# Patient Record
Sex: FEMALE | Race: BLACK OR AFRICAN AMERICAN | NOT HISPANIC OR LATINO | Employment: UNEMPLOYED | URBAN - METROPOLITAN AREA
[De-identification: names, ages, dates, MRNs, and addresses within clinical notes are randomized per-mention and may not be internally consistent; named-entity substitution may affect disease eponyms.]

---

## 2023-11-25 ENCOUNTER — HOSPITAL ENCOUNTER (INPATIENT)
Facility: HOSPITAL | Age: 38
LOS: 4 days | Discharge: HOME | DRG: 871 | End: 2023-11-30
Attending: EMERGENCY MEDICINE | Admitting: STUDENT IN AN ORGANIZED HEALTH CARE EDUCATION/TRAINING PROGRAM

## 2023-11-25 DIAGNOSIS — N39.0 URINARY TRACT INFECTION WITHOUT HEMATURIA, SITE UNSPECIFIED: ICD-10-CM

## 2023-11-25 DIAGNOSIS — U07.1 COVID-19 VIRUS INFECTION: ICD-10-CM

## 2023-11-25 DIAGNOSIS — E11.65 TYPE 2 DIABETES MELLITUS WITH HYPERGLYCEMIA, WITHOUT LONG-TERM CURRENT USE OF INSULIN (H): Primary | ICD-10-CM

## 2023-11-25 LAB
ALBUMIN UR-MCNC: 70 MG/DL
ANION GAP SERPL CALCULATED.3IONS-SCNC: 17 MMOL/L (ref 7–15)
APPEARANCE UR: ABNORMAL
BACTERIA #/AREA URNS HPF: ABNORMAL /HPF
BASOPHILS # BLD AUTO: 0.1 10E3/UL (ref 0–0.2)
BASOPHILS NFR BLD AUTO: 1 %
BILIRUB UR QL STRIP: NEGATIVE
BUN SERPL-MCNC: 16.7 MG/DL (ref 6–20)
CALCIUM SERPL-MCNC: 8.8 MG/DL (ref 8.6–10)
CHLORIDE SERPL-SCNC: 94 MMOL/L (ref 98–107)
COLOR UR AUTO: YELLOW
CREAT SERPL-MCNC: 0.76 MG/DL (ref 0.51–0.95)
DEPRECATED HCO3 PLAS-SCNC: 18 MMOL/L (ref 22–29)
EGFRCR SERPLBLD CKD-EPI 2021: >90 ML/MIN/1.73M2
EOSINOPHIL # BLD AUTO: 0 10E3/UL (ref 0–0.7)
EOSINOPHIL NFR BLD AUTO: 0 %
ERYTHROCYTE [DISTWIDTH] IN BLOOD BY AUTOMATED COUNT: 14.9 % (ref 10–15)
FLUAV RNA SPEC QL NAA+PROBE: NEGATIVE
FLUBV RNA RESP QL NAA+PROBE: NEGATIVE
GLUCOSE BLDC GLUCOMTR-MCNC: 335 MG/DL (ref 70–99)
GLUCOSE SERPL-MCNC: 396 MG/DL (ref 70–99)
GLUCOSE UR STRIP-MCNC: >1000 MG/DL
HBA1C MFR BLD: 13.5 %
HCT VFR BLD AUTO: 32.9 % (ref 35–47)
HGB BLD-MCNC: 10.1 G/DL (ref 11.7–15.7)
HGB UR QL STRIP: ABNORMAL
IMM GRANULOCYTES # BLD: 0 10E3/UL
IMM GRANULOCYTES NFR BLD: 0 %
KETONES UR STRIP-MCNC: 150 MG/DL
LACTATE SERPL-SCNC: 1.4 MMOL/L (ref 0.7–2)
LEUKOCYTE ESTERASE UR QL STRIP: ABNORMAL
LYMPHOCYTES # BLD AUTO: 0.9 10E3/UL (ref 0.8–5.3)
LYMPHOCYTES NFR BLD AUTO: 8 %
MCH RBC QN AUTO: 23 PG (ref 26.5–33)
MCHC RBC AUTO-ENTMCNC: 30.7 G/DL (ref 31.5–36.5)
MCV RBC AUTO: 75 FL (ref 78–100)
MONOCYTES # BLD AUTO: 1.2 10E3/UL (ref 0–1.3)
MONOCYTES NFR BLD AUTO: 11 %
MUCOUS THREADS #/AREA URNS LPF: PRESENT /LPF
NEUTROPHILS # BLD AUTO: 8.8 10E3/UL (ref 1.6–8.3)
NEUTROPHILS NFR BLD AUTO: 80 %
NITRATE UR QL: NEGATIVE
NRBC # BLD AUTO: 0 10E3/UL
NRBC BLD AUTO-RTO: 0 /100
PH UR STRIP: 6 [PH] (ref 5–7)
PLATELET # BLD AUTO: 232 10E3/UL (ref 150–450)
POTASSIUM SERPL-SCNC: 3.8 MMOL/L (ref 3.4–5.3)
RBC # BLD AUTO: 4.4 10E6/UL (ref 3.8–5.2)
RBC URINE: 18 /HPF
RSV RNA SPEC NAA+PROBE: NEGATIVE
SARS-COV-2 RNA RESP QL NAA+PROBE: POSITIVE
SODIUM SERPL-SCNC: 129 MMOL/L (ref 135–145)
SP GR UR STRIP: 1.02 (ref 1–1.03)
SQUAMOUS EPITHELIAL: 9 /HPF
UROBILINOGEN UR STRIP-MCNC: <2 MG/DL
WBC # BLD AUTO: 11 10E3/UL (ref 4–11)
WBC CLUMPS #/AREA URNS HPF: PRESENT /HPF
WBC URINE: >182 /HPF

## 2023-11-25 PROCEDURE — 83735 ASSAY OF MAGNESIUM: CPT | Performed by: INTERNAL MEDICINE

## 2023-11-25 PROCEDURE — 87637 SARSCOV2&INF A&B&RSV AMP PRB: CPT | Performed by: EMERGENCY MEDICINE

## 2023-11-25 PROCEDURE — 85025 COMPLETE CBC W/AUTO DIFF WBC: CPT | Performed by: EMERGENCY MEDICINE

## 2023-11-25 PROCEDURE — 250N000013 HC RX MED GY IP 250 OP 250 PS 637: Performed by: STUDENT IN AN ORGANIZED HEALTH CARE EDUCATION/TRAINING PROGRAM

## 2023-11-25 PROCEDURE — 96375 TX/PRO/DX INJ NEW DRUG ADDON: CPT

## 2023-11-25 PROCEDURE — 83605 ASSAY OF LACTIC ACID: CPT | Performed by: EMERGENCY MEDICINE

## 2023-11-25 PROCEDURE — 96365 THER/PROPH/DIAG IV INF INIT: CPT | Mod: 59

## 2023-11-25 PROCEDURE — 87077 CULTURE AEROBIC IDENTIFY: CPT | Performed by: EMERGENCY MEDICINE

## 2023-11-25 PROCEDURE — 250N000012 HC RX MED GY IP 250 OP 636 PS 637: Performed by: STUDENT IN AN ORGANIZED HEALTH CARE EDUCATION/TRAINING PROGRAM

## 2023-11-25 PROCEDURE — 87086 URINE CULTURE/COLONY COUNT: CPT | Performed by: EMERGENCY MEDICINE

## 2023-11-25 PROCEDURE — 87149 DNA/RNA DIRECT PROBE: CPT | Performed by: EMERGENCY MEDICINE

## 2023-11-25 PROCEDURE — 81001 URINALYSIS AUTO W/SCOPE: CPT | Performed by: EMERGENCY MEDICINE

## 2023-11-25 PROCEDURE — 99285 EMERGENCY DEPT VISIT HI MDM: CPT | Mod: 25

## 2023-11-25 PROCEDURE — 80053 COMPREHEN METABOLIC PANEL: CPT | Performed by: EMERGENCY MEDICINE

## 2023-11-25 PROCEDURE — 94640 AIRWAY INHALATION TREATMENT: CPT

## 2023-11-25 PROCEDURE — 82962 GLUCOSE BLOOD TEST: CPT

## 2023-11-25 PROCEDURE — 82248 BILIRUBIN DIRECT: CPT | Performed by: INTERNAL MEDICINE

## 2023-11-25 PROCEDURE — G0378 HOSPITAL OBSERVATION PER HR: HCPCS

## 2023-11-25 PROCEDURE — 250N000011 HC RX IP 250 OP 636: Mod: JZ | Performed by: EMERGENCY MEDICINE

## 2023-11-25 PROCEDURE — 250N000011 HC RX IP 250 OP 636: Mod: JZ | Performed by: STUDENT IN AN ORGANIZED HEALTH CARE EDUCATION/TRAINING PROGRAM

## 2023-11-25 PROCEDURE — 250N000009 HC RX 250: Performed by: EMERGENCY MEDICINE

## 2023-11-25 PROCEDURE — 99222 1ST HOSP IP/OBS MODERATE 55: CPT | Performed by: STUDENT IN AN ORGANIZED HEALTH CARE EDUCATION/TRAINING PROGRAM

## 2023-11-25 PROCEDURE — 82728 ASSAY OF FERRITIN: CPT | Performed by: STUDENT IN AN ORGANIZED HEALTH CARE EDUCATION/TRAINING PROGRAM

## 2023-11-25 PROCEDURE — 36415 COLL VENOUS BLD VENIPUNCTURE: CPT | Performed by: EMERGENCY MEDICINE

## 2023-11-25 PROCEDURE — 96361 HYDRATE IV INFUSION ADD-ON: CPT

## 2023-11-25 PROCEDURE — 83036 HEMOGLOBIN GLYCOSYLATED A1C: CPT | Performed by: STUDENT IN AN ORGANIZED HEALTH CARE EDUCATION/TRAINING PROGRAM

## 2023-11-25 PROCEDURE — 250N000013 HC RX MED GY IP 250 OP 250 PS 637: Performed by: EMERGENCY MEDICINE

## 2023-11-25 PROCEDURE — 258N000003 HC RX IP 258 OP 636: Performed by: EMERGENCY MEDICINE

## 2023-11-25 PROCEDURE — 87637 SARSCOV2&INF A&B&RSV AMP PRB: CPT | Performed by: STUDENT IN AN ORGANIZED HEALTH CARE EDUCATION/TRAINING PROGRAM

## 2023-11-25 RX ORDER — PROCHLORPERAZINE 25 MG
25 SUPPOSITORY, RECTAL RECTAL EVERY 12 HOURS PRN
Status: DISCONTINUED | OUTPATIENT
Start: 2023-11-25 | End: 2023-11-30 | Stop reason: HOSPADM

## 2023-11-25 RX ORDER — POLYETHYLENE GLYCOL 3350 17 G/17G
17 POWDER, FOR SOLUTION ORAL 2 TIMES DAILY PRN
Status: DISCONTINUED | OUTPATIENT
Start: 2023-11-25 | End: 2023-11-30 | Stop reason: HOSPADM

## 2023-11-25 RX ORDER — LEVOFLOXACIN 750 MG/1
750 TABLET, FILM COATED ORAL DAILY
Status: DISCONTINUED | OUTPATIENT
Start: 2023-11-26 | End: 2023-11-26

## 2023-11-25 RX ORDER — ONDANSETRON 4 MG/1
4 TABLET, ORALLY DISINTEGRATING ORAL EVERY 6 HOURS PRN
Status: DISCONTINUED | OUTPATIENT
Start: 2023-11-25 | End: 2023-11-30 | Stop reason: HOSPADM

## 2023-11-25 RX ORDER — ACETAMINOPHEN 325 MG/1
650 TABLET ORAL ONCE
Status: DISCONTINUED | OUTPATIENT
Start: 2023-11-25 | End: 2023-11-25

## 2023-11-25 RX ORDER — IBUPROFEN 600 MG/1
600 TABLET, FILM COATED ORAL ONCE
Status: COMPLETED | OUTPATIENT
Start: 2023-11-25 | End: 2023-11-25

## 2023-11-25 RX ORDER — NICOTINE POLACRILEX 4 MG
15-30 LOZENGE BUCCAL
Status: DISCONTINUED | OUTPATIENT
Start: 2023-11-25 | End: 2023-11-27

## 2023-11-25 RX ORDER — AMOXICILLIN 250 MG
2 CAPSULE ORAL 2 TIMES DAILY PRN
Status: DISCONTINUED | OUTPATIENT
Start: 2023-11-25 | End: 2023-11-30 | Stop reason: HOSPADM

## 2023-11-25 RX ORDER — PROCHLORPERAZINE MALEATE 10 MG
10 TABLET ORAL EVERY 6 HOURS PRN
Status: DISCONTINUED | OUTPATIENT
Start: 2023-11-25 | End: 2023-11-30 | Stop reason: HOSPADM

## 2023-11-25 RX ORDER — DEXTROSE MONOHYDRATE 25 G/50ML
25-50 INJECTION, SOLUTION INTRAVENOUS
Status: DISCONTINUED | OUTPATIENT
Start: 2023-11-25 | End: 2023-11-27

## 2023-11-25 RX ORDER — IPRATROPIUM BROMIDE AND ALBUTEROL SULFATE 2.5; .5 MG/3ML; MG/3ML
3 SOLUTION RESPIRATORY (INHALATION) ONCE
Status: COMPLETED | OUTPATIENT
Start: 2023-11-25 | End: 2023-11-25

## 2023-11-25 RX ORDER — ACETAMINOPHEN 325 MG/1
650 TABLET ORAL ONCE
Status: COMPLETED | OUTPATIENT
Start: 2023-11-25 | End: 2023-11-25

## 2023-11-25 RX ORDER — CEFTRIAXONE 1 G/1
1 INJECTION, POWDER, FOR SOLUTION INTRAMUSCULAR; INTRAVENOUS ONCE
Status: COMPLETED | OUTPATIENT
Start: 2023-11-25 | End: 2023-11-25

## 2023-11-25 RX ORDER — ACETAMINOPHEN 325 MG/1
975 TABLET ORAL 3 TIMES DAILY PRN
Status: DISCONTINUED | OUTPATIENT
Start: 2023-11-25 | End: 2023-11-30 | Stop reason: HOSPADM

## 2023-11-25 RX ORDER — GABAPENTIN 300 MG/1
300 CAPSULE ORAL 3 TIMES DAILY
Status: DISCONTINUED | OUTPATIENT
Start: 2023-11-25 | End: 2023-11-30 | Stop reason: HOSPADM

## 2023-11-25 RX ORDER — GABAPENTIN 300 MG/1
300 CAPSULE ORAL 3 TIMES DAILY
COMMUNITY

## 2023-11-25 RX ORDER — ONDANSETRON 2 MG/ML
4 INJECTION INTRAMUSCULAR; INTRAVENOUS EVERY 6 HOURS PRN
Status: DISCONTINUED | OUTPATIENT
Start: 2023-11-25 | End: 2023-11-30 | Stop reason: HOSPADM

## 2023-11-25 RX ORDER — AMOXICILLIN 250 MG
1 CAPSULE ORAL 2 TIMES DAILY PRN
Status: DISCONTINUED | OUTPATIENT
Start: 2023-11-25 | End: 2023-11-30 | Stop reason: HOSPADM

## 2023-11-25 RX ADMIN — INSULIN GLARGINE 15 UNITS: 100 INJECTION, SOLUTION SUBCUTANEOUS at 23:08

## 2023-11-25 RX ADMIN — IPRATROPIUM BROMIDE AND ALBUTEROL SULFATE 3 ML: .5; 3 SOLUTION RESPIRATORY (INHALATION) at 16:07

## 2023-11-25 RX ADMIN — ACETAMINOPHEN 975 MG: 325 TABLET ORAL at 21:51

## 2023-11-25 RX ADMIN — SODIUM CHLORIDE 1000 ML: 9 INJECTION, SOLUTION INTRAVENOUS at 18:24

## 2023-11-25 RX ADMIN — ONDANSETRON 4 MG: 2 INJECTION INTRAMUSCULAR; INTRAVENOUS at 22:28

## 2023-11-25 RX ADMIN — ACETAMINOPHEN 650 MG: 325 TABLET ORAL at 14:37

## 2023-11-25 RX ADMIN — IBUPROFEN 600 MG: 600 TABLET, FILM COATED ORAL at 16:22

## 2023-11-25 RX ADMIN — CEFTRIAXONE SODIUM 1 G: 1 INJECTION, POWDER, FOR SOLUTION INTRAMUSCULAR; INTRAVENOUS at 17:48

## 2023-11-25 RX ADMIN — GABAPENTIN 300 MG: 300 CAPSULE ORAL at 21:51

## 2023-11-25 RX ADMIN — SODIUM CHLORIDE 1000 ML: 9 INJECTION, SOLUTION INTRAVENOUS at 16:58

## 2023-11-25 ASSESSMENT — ACTIVITIES OF DAILY LIVING (ADL)
ADLS_ACUITY_SCORE: 35

## 2023-11-25 NOTE — ED TRIAGE NOTES
Patient presents here with fever, chills, and vomiting that has occurred over the past three days.

## 2023-11-25 NOTE — ED PROVIDER NOTES
EMERGENCY DEPARTMENT ENCOUNTER      NAME: Sandee Arroyo  AGE: 38 year old female  YOB: 1985  MRN: [id number]  EVALUATION DATE & TIME: No admission date for patient encounter.    PCP: No Ref-Primary, Physician    ED PROVIDER: Sylvain Tatum D.O.      Chief Complaint   Patient presents with    Fever       FINAL IMPRESSION:  1. COVID-19 virus infection    2. Urinary tract infection without hematuria, site unspecified        ED COURSE & MEDICAL DECISION MAKING:    3:42 PM I met with the patient to gather history and to perform my initial exam. I discussed the plan for care while in the Emergency Department.  3:51 PM I spoke with Pharmacy regarding paxlovid and gabapentin  4:42 PM I rechecked and updated the patient   5:48 PM I rechecked and updated the patient   6:31 PM I spoke with Dr. Spencer, Hospitalist        Pertinent Labs & Imaging studies reviewed. (See chart for details)  38 year old female presents to the Emergency Department for evaluation of fever, body aches, fatigue, headache.  Patient also reported some urinary symptoms.  Known asthmatic as well.  Initial differential did include COVID-19, RSV, influenza.  Additionally was concern for the potential for UTI.  COVID-19 testing did come back positive with negative influenza and RSV testing.  UA also did come back positive consistent with a UTI.  Patient was febrile in the emergency department, that we did give her ibuprofen and Tylenol which brought the fever down.  Unfortunately this did not improve her tachycardia.  Even after IV fluids, she remained tachycardic did not show any definitive signs of sepsis.  However, because of this I did not feel comfortable with discharge to home, will admit for further management for UTI with early signs of potential sepsis.    Medical Decision Making    History:  Supplemental history from: Documented in chart, if applicable  External Record(s) reviewed: Documented in chart, if applicable.    Work  Up:  Chart documentation includes differential considered and any EKGs or imaging independently interpreted by provider, where specified.  In additional to work up documented, I considered the following work up: Documented in chart, if applicable.    External consultation:  Discussion of management with another provider: Documented in chart, if applicable    Complicating factors:  Care impacted by chronic illness: Cerebrovascular Disease  Care affected by social determinants of health: N/A    Disposition considerations: Admit.        At the conclusion of the encounter I discussed the results of all of the tests and the disposition. The questions were answered. The patient or family acknowledged understanding and was agreeable with the care plan.        HPI    Patient information was obtained from: Patient     Use of : N/A      Sandee Arroyo is a 38 year old female who presents with fever     Per Chart Review: Patient has a history of asthma and a stroke in 2007 after giving birth.     Per patient, she has had 3 days of cough, congestion, myalgias, chills, fever, and vomiting. She also has some urinary frequency, which is similar to her previous UTI. She denies sore throat.       REVIEW OF SYSTEMS  Constitutional:  Denies weight loss or weakness. Positive for fever, chills  Eyes:  No pain, discharge, redness  HENT:  Denies sore throat, ear pain. Positive for congestion.   Respiratory: No SOB, wheeze. Positive for cough  Cardiovascular:  No CP, palpitations  GI:  Denies abdominal pain, diarrhea. Positive for nausea, vomiting.   : Denies dysuria, hematuria. Positive for urinary frequency.   Musculoskeletal:  Denies any swelling or loss of function. Positive for myalgias.   Skin:  Denies rash, pallor  Neurologic:  Denies headache, focal weakness or sensory changes  Lymph: Denies swollen nodes    All other systems negative unless noted in HPI.    PAST MEDICAL HISTORY:  No past medical history on  file.    PAST SURGICAL HISTORY:  No past surgical history on file.      CURRENT MEDICATIONS:    Current Facility-Administered Medications   Medication    acetaminophen (TYLENOL) tablet 975 mg    glucose gel 15-30 g    Or    dextrose 50 % injection 25-50 mL    Or    glucagon injection 1 mg    gabapentin (NEURONTIN) capsule 300 mg    [START ON 11/26/2023] insulin aspart (NovoLOG) injection (RAPID ACTING)    insulin aspart (NovoLOG) injection (RAPID ACTING)    insulin glargine (LANTUS PEN) injection 15 Units    [START ON 11/26/2023] levofloxacin (LEVAQUIN) tablet 750 mg    melatonin tablet 5 mg    ondansetron (ZOFRAN ODT) ODT tab 4 mg    Or    ondansetron (ZOFRAN) injection 4 mg    polyethylene glycol (MIRALAX) Packet 17 g    prochlorperazine (COMPAZINE) injection 10 mg    Or    prochlorperazine (COMPAZINE) tablet 10 mg    Or    prochlorperazine (COMPAZINE) suppository 25 mg    senna-docusate (SENOKOT-S/PERICOLACE) 8.6-50 MG per tablet 1 tablet    Or    senna-docusate (SENOKOT-S/PERICOLACE) 8.6-50 MG per tablet 2 tablet     Current Outpatient Medications   Medication    gabapentin (NEURONTIN) 300 MG capsule         ALLERGIES:  No Known Allergies    FAMILY HISTORY:  No family history on file.    SOCIAL HISTORY:  Social History     Socioeconomic History    Marital status: Single       VITALS:  Patient Vitals for the past 24 hrs:   BP Temp Temp src Pulse Resp SpO2 Height Weight   11/25/23 1900 108/56 -- -- 105 -- 99 % -- --   11/25/23 1846 119/57 -- -- 111 -- 99 % -- --   11/25/23 1836 -- -- -- 108 -- 98 % -- --   11/25/23 1821 99/52 -- -- 112 -- 98 % -- --   11/25/23 1806 102/59 -- -- 112 -- 98 % -- --   11/25/23 1751 -- -- -- 116 -- 100 % -- --   11/25/23 1736 96/54 -- -- 117 -- 99 % -- --   11/25/23 1721 104/58 -- -- 116 -- 99 % -- --   11/25/23 1720 -- -- -- 116 -- 100 % -- --   11/25/23 1710 -- -- -- 119 -- 99 % -- --   11/25/23 1700 123/67 -- -- -- -- -- -- --   11/25/23 1640 114/60 -- -- (!) 124 -- 98 % -- --  "  11/25/23 1637 -- -- -- (!) 127 -- 98 % -- --   11/25/23 1627 108/55 -- -- (!) 129 -- 99 % -- --   11/25/23 1617 -- -- -- (!) 125 -- 98 % -- --   11/25/23 1607 111/59 -- -- (!) 121 -- 99 % -- --   11/25/23 1426 116/72 100.3  F (37.9  C) Oral (!) 139 18 100 % 1.6 m (5' 3\") 81.6 kg (180 lb)       PHYSICAL EXAM    VITAL SIGNS: /56   Pulse 105   Temp 100.3  F (37.9  C) (Oral)   Resp 18   Ht 1.6 m (5' 3\")   Wt 81.6 kg (180 lb)   SpO2 99%   BMI 31.89 kg/m      General Appearance: Well-appearing, well-nourished, no acute distress   Head:  Normocephalic, without obvious abnormality, atraumatic  Eyes:  PERRL, conjunctiva/corneas clear, EOM's intact,  ENT:  Lips, mucosa, and tongue normal, membranes are moist without pallor  Neck:  Normal ROM, symmetrical, trachea midline    Cardio:  Regular rate and rhythm, no murmur, rub or gallop, 2+ pulses symmetric in all extremities  Pulm:  Clear to auscultation bilaterally, respirations unlabored,  Abdomen:  Soft, non-tender, no rebound or guarding.  Musculoskeletal: Full ROM, no edema, no cyanosis, good ROM of major joints  Integument:  Warm, Dry, No erythema, No rash.    Neurologic:  Alert & oriented.  No focal deficits appreciated.  Ambulatory.  Psychiatric:  Affect normal, Judgment normal, Mood normal.      LABS  Results for orders placed or performed during the hospital encounter of 11/25/23 (from the past 24 hour(s))   Symptomatic Influenza A/B, RSV, & SARS-CoV2 PCR (COVID-19) Nasopharyngeal    Specimen: Nasopharyngeal; Swab   Result Value Ref Range    Influenza A PCR Negative Negative    Influenza B PCR Negative Negative    RSV PCR Negative Negative    SARS CoV2 PCR Positive (A) Negative    Narrative    Testing was performed using the Xpert Xpress CoV2/Flu/RSV Assay on the [employer] Instrument. This test should be ordered for the detection of SARS-CoV-2, influenza, and RSV viruses in individuals who meet clinical and/or epidemiological criteria. Test " performance is unknown in asymptomatic patients. This test is for in vitro diagnostic use under the FDA EUA for laboratories certified under CLIA to perform high or moderate complexity testing. This test has not been FDA cleared or approved. A negative result does not rule out the presence of PCR inhibitors in the specimen or target RNA in concentration below the limit of detection for the assay. If only one viral target is positive but coinfection with multiple targets is suspected, the sample should be re-tested with another FDA cleared, approved, or authorized test, if coinfection would change clinical management. This test was validated by the [facility]. These laboratories are certified under the Clinical Laboratory Improvement Amendments of 1988 (CLIA-88) as qualified to perform high complexity laboratory testing.   UA with Microscopic reflex to Culture    Specimen: Urine, Midstream   Result Value Ref Range    Color Urine Yellow Colorless, Straw, Light Yellow, Yellow    Appearance Urine Cloudy (A) Clear    Glucose Urine >1000 (A) Negative mg/dL    Bilirubin Urine Negative Negative    Ketones Urine 150 (A) Negative mg/dL    Specific Gravity Urine 1.023 1.001 - 1.030    Blood Urine 1.0 mg/dL (A) Negative    pH Urine 6.0 5.0 - 7.0    Protein Albumin Urine 70 (A) Negative mg/dL    Urobilinogen Urine <2.0 <2.0 mg/dL    Nitrite Urine Negative Negative    Leukocyte Esterase Urine 500 Aaron/uL (A) Negative    Bacteria Urine Many (A) None Seen /HPF    WBC Clumps Urine Present (A) None Seen /HPF    Mucus Urine Present (A) None Seen /LPF    RBC Urine 18 (H) <=2 /HPF    WBC Urine >182 (H) <=5 /HPF    Squamous Epithelials Urine 9 (H) <=1 /HPF    Narrative    Urine Culture ordered based on laboratory criteria   CBC with platelets + differential    Narrative    The following orders were created for panel order CBC with platelets + differential.  Procedure                               Abnormality          Status                     ---------                               -----------         ------                     CBC with platelets and d...[[id number]]  Abnormal            Final result                 Please view results for these tests on the individual orders.   Basic metabolic panel   Result Value Ref Range    Sodium 129 (L) 135 - 145 mmol/L    Potassium 3.8 3.4 - 5.3 mmol/L    Chloride 94 (L) 98 - 107 mmol/L    Carbon Dioxide (CO2) 18 (L) 22 - 29 mmol/L    Anion Gap 17 (H) 7 - 15 mmol/L    Urea Nitrogen 16.7 6.0 - 20.0 mg/dL    Creatinine 0.76 0.51 - 0.95 mg/dL    GFR Estimate >90 >60 mL/min/1.73m2    Calcium 8.8 8.6 - 10.0 mg/dL    Glucose 396 (H) 70 - 99 mg/dL   Lactic acid whole blood   Result Value Ref Range    Lactic Acid 1.4 0.7 - 2.0 mmol/L   CBC with platelets and differential   Result Value Ref Range    WBC Count 11.0 4.0 - 11.0 10e3/uL    RBC Count 4.40 3.80 - 5.20 10e6/uL    Hemoglobin 10.1 (L) 11.7 - 15.7 g/dL    Hematocrit 32.9 (L) 35.0 - 47.0 %    MCV 75 (L) 78 - 100 fL    MCH 23.0 (L) 26.5 - 33.0 pg    MCHC 30.7 (L) 31.5 - 36.5 g/dL    RDW 14.9 10.0 - 15.0 %    Platelet Count 232 150 - 450 10e3/uL    % Neutrophils 80 %    % Lymphocytes 8 %    % Monocytes 11 %    % Eosinophils 0 %    % Basophils 1 %    % Immature Granulocytes 0 %    NRBCs per 100 WBC 0 <1 /100    Absolute Neutrophils 8.8 (H) 1.6 - 8.3 10e3/uL    Absolute Lymphocytes 0.9 0.8 - 5.3 10e3/uL    Absolute Monocytes 1.2 0.0 - 1.3 10e3/uL    Absolute Eosinophils 0.0 0.0 - 0.7 10e3/uL    Absolute Basophils 0.1 0.0 - 0.2 10e3/uL    Absolute Immature Granulocytes 0.0 <=0.4 10e3/uL    Absolute NRBCs 0.0 10e3/uL   Hemoglobin A1c   Result Value Ref Range    Hemoglobin A1C 13.5 (H) <5.7 %         RADIOLOGY  No orders to display         MEDICATIONS GIVEN IN THE EMERGENCY:  Medications   gabapentin (NEURONTIN) capsule 300 mg (has no administration in time range)   senna-docusate (SENOKOT-S/PERICOLACE) 8.6-50 MG per tablet 1 tablet (has no  administration in time range)     Or   senna-docusate (SENOKOT-S/PERICOLACE) 8.6-50 MG per tablet 2 tablet (has no administration in time range)   ondansetron (ZOFRAN ODT) ODT tab 4 mg (has no administration in time range)     Or   ondansetron (ZOFRAN) injection 4 mg (has no administration in time range)   acetaminophen (TYLENOL) tablet 975 mg (has no administration in time range)   melatonin tablet 5 mg (has no administration in time range)   polyethylene glycol (MIRALAX) Packet 17 g (has no administration in time range)   prochlorperazine (COMPAZINE) injection 10 mg (has no administration in time range)     Or   prochlorperazine (COMPAZINE) tablet 10 mg (has no administration in time range)     Or   prochlorperazine (COMPAZINE) suppository 25 mg (has no administration in time range)   levofloxacin (LEVAQUIN) tablet 750 mg (has no administration in time range)   glucose gel 15-30 g (has no administration in time range)     Or   dextrose 50 % injection 25-50 mL (has no administration in time range)     Or   glucagon injection 1 mg (has no administration in time range)   insulin glargine (LANTUS PEN) injection 15 Units (has no administration in time range)   insulin aspart (NovoLOG) injection (RAPID ACTING) (has no administration in time range)   insulin aspart (NovoLOG) injection (RAPID ACTING) (has no administration in time range)   acetaminophen (TYLENOL) tablet 650 mg (650 mg Oral $Given 11/25/23 1437)   ipratropium - albuterol 0.5 mg/2.5 mg/3 mL (DUONEB) neb solution 3 mL (3 mLs Nebulization $Given 11/25/23 1607)   ibuprofen (ADVIL/MOTRIN) tablet 600 mg (600 mg Oral $Given 11/25/23 1622)   sodium chloride 0.9% BOLUS 1,000 mL (0 mLs Intravenous Stopped 11/25/23 1748)   cefTRIAXone (ROCEPHIN) 1 g vial to attach to  mL bag for ADULTS or NS 50 mL bag for PEDS (0 g Intravenous Stopped 11/25/23 1817)   sodium chloride 0.9% BOLUS 1,000 mL (1,000 mLs Intravenous $New Bag 11/25/23 1824)       NEW PRESCRIPTIONS  STARTED AT TODAY'S ER VISIT  New Prescriptions    No medications on file        I, Tez Palm, am serving as a scribe to document services personally performed by Sylvain Tatum D.O., based on my observations and the provider's statements to me.  I, Sylvain Tatum D.O., attest that Tez Palm is acting in a scribe capacity, has observed my performance of the services and has documented them in accordance with my direction.     Sylvain Tatum D.O.  Emergency Medicine  [facility] EMERGENCY DEPARTMENT  [address] [place] [id number]  [phone]  Dept: [phone]       Sylvain Tatum,   11/25/23 2050

## 2023-11-26 ENCOUNTER — APPOINTMENT (OUTPATIENT)
Dept: CT IMAGING | Facility: HOSPITAL | Age: 38
DRG: 871 | End: 2023-11-26
Attending: INTERNAL MEDICINE

## 2023-11-26 LAB
ACINETOBACTER SPECIES: NOT DETECTED
ALBUMIN SERPL BCG-MCNC: 3.6 G/DL (ref 3.5–5.2)
ALBUMIN SERPL BCG-MCNC: 3.8 G/DL (ref 3.5–5.2)
ALP SERPL-CCNC: 108 U/L (ref 40–150)
ALP SERPL-CCNC: 111 U/L (ref 40–150)
ALT SERPL W P-5'-P-CCNC: 8 U/L (ref 0–50)
ALT SERPL W P-5'-P-CCNC: 9 U/L (ref 0–50)
ANION GAP SERPL CALCULATED.3IONS-SCNC: 14 MMOL/L (ref 7–15)
ANION GAP SERPL CALCULATED.3IONS-SCNC: 14 MMOL/L (ref 7–15)
AST SERPL W P-5'-P-CCNC: 15 U/L (ref 0–45)
AST SERPL W P-5'-P-CCNC: 16 U/L (ref 0–45)
BACTERIA UR CULT: ABNORMAL
BASOPHILS # BLD AUTO: 0 10E3/UL (ref 0–0.2)
BASOPHILS NFR BLD AUTO: 0 %
BILIRUB DIRECT SERPL-MCNC: 0.22 MG/DL (ref 0–0.3)
BILIRUB SERPL-MCNC: 0.6 MG/DL
BILIRUB SERPL-MCNC: 0.7 MG/DL
BUN SERPL-MCNC: 12.9 MG/DL (ref 6–20)
BUN SERPL-MCNC: 12.9 MG/DL (ref 6–20)
CALCIUM SERPL-MCNC: 8.2 MG/DL (ref 8.6–10)
CALCIUM SERPL-MCNC: 8.2 MG/DL (ref 8.6–10)
CHLORIDE SERPL-SCNC: 98 MMOL/L (ref 98–107)
CHLORIDE SERPL-SCNC: 98 MMOL/L (ref 98–107)
CITROBACTER SPECIES: NOT DETECTED
CREAT SERPL-MCNC: 0.68 MG/DL (ref 0.51–0.95)
CREAT SERPL-MCNC: 0.68 MG/DL (ref 0.51–0.95)
CRP SERPL-MCNC: 277.9 MG/L
CTX-M: NOT DETECTED
D DIMER PPP FEU-MCNC: 1.35 UG/ML FEU (ref 0–0.5)
DEPRECATED HCO3 PLAS-SCNC: 20 MMOL/L (ref 22–29)
DEPRECATED HCO3 PLAS-SCNC: 20 MMOL/L (ref 22–29)
EGFRCR SERPLBLD CKD-EPI 2021: >90 ML/MIN/1.73M2
EGFRCR SERPLBLD CKD-EPI 2021: >90 ML/MIN/1.73M2
ENTEROBACTER SPECIES: NOT DETECTED
EOSINOPHIL # BLD AUTO: 0 10E3/UL (ref 0–0.7)
EOSINOPHIL NFR BLD AUTO: 0 %
ERYTHROCYTE [DISTWIDTH] IN BLOOD BY AUTOMATED COUNT: 15.1 % (ref 10–15)
ESCHERICHIA COLI: DETECTED
FERRITIN SERPL-MCNC: 62 NG/ML (ref 6–175)
GLUCOSE BLDC GLUCOMTR-MCNC: 254 MG/DL (ref 70–99)
GLUCOSE BLDC GLUCOMTR-MCNC: 285 MG/DL (ref 70–99)
GLUCOSE BLDC GLUCOMTR-MCNC: 291 MG/DL (ref 70–99)
GLUCOSE BLDC GLUCOMTR-MCNC: 328 MG/DL (ref 70–99)
GLUCOSE BLDC GLUCOMTR-MCNC: 330 MG/DL (ref 70–99)
GLUCOSE SERPL-MCNC: 311 MG/DL (ref 70–99)
GLUCOSE SERPL-MCNC: 311 MG/DL (ref 70–99)
HCG SERPL QL: NEGATIVE
HCT VFR BLD AUTO: 28.6 % (ref 35–47)
HGB BLD-MCNC: 8.8 G/DL (ref 11.7–15.7)
HOLD SPECIMEN: NORMAL
HOLD SPECIMEN: NORMAL
IMM GRANULOCYTES # BLD: 0.1 10E3/UL
IMM GRANULOCYTES NFR BLD: 1 %
IMP: NOT DETECTED
INR PPP: 1.27 (ref 0.85–1.15)
KLEBSIELLA OXYTOCA: NOT DETECTED
KLEBSIELLA PNEUMONIAE: NOT DETECTED
KPC: NOT DETECTED
LACTATE SERPL-SCNC: 1 MMOL/L (ref 0.7–2)
LYMPHOCYTES # BLD AUTO: 0.4 10E3/UL (ref 0.8–5.3)
LYMPHOCYTES NFR BLD AUTO: 4 %
MAGNESIUM SERPL-MCNC: 1.6 MG/DL (ref 1.7–2.3)
MAGNESIUM SERPL-MCNC: 1.7 MG/DL (ref 1.7–2.3)
MCH RBC QN AUTO: 23 PG (ref 26.5–33)
MCHC RBC AUTO-ENTMCNC: 30.8 G/DL (ref 31.5–36.5)
MCV RBC AUTO: 75 FL (ref 78–100)
MONOCYTES # BLD AUTO: 0.9 10E3/UL (ref 0–1.3)
MONOCYTES NFR BLD AUTO: 9 %
NDM: NOT DETECTED
NEUTROPHILS # BLD AUTO: 9 10E3/UL (ref 1.6–8.3)
NEUTROPHILS NFR BLD AUTO: 86 %
NRBC # BLD AUTO: 0 10E3/UL
NRBC BLD AUTO-RTO: 0 /100
OXA (DETECTED/NOT DETECTED): NOT DETECTED
PLATELET # BLD AUTO: 226 10E3/UL (ref 150–450)
POTASSIUM SERPL-SCNC: 4.1 MMOL/L (ref 3.4–5.3)
POTASSIUM SERPL-SCNC: 4.1 MMOL/L (ref 3.4–5.3)
PROCALCITONIN SERPL IA-MCNC: 5.53 NG/ML
PROT SERPL-MCNC: 6.6 G/DL (ref 6.4–8.3)
PROT SERPL-MCNC: 6.9 G/DL (ref 6.4–8.3)
PROTEUS SPECIES: NOT DETECTED
PSEUDOMONAS AERUGINOSA: NOT DETECTED
RBC # BLD AUTO: 3.82 10E6/UL (ref 3.8–5.2)
SODIUM SERPL-SCNC: 132 MMOL/L (ref 135–145)
SODIUM SERPL-SCNC: 132 MMOL/L (ref 135–145)
VIM: NOT DETECTED
WBC # BLD AUTO: 10.4 10E3/UL (ref 4–11)

## 2023-11-26 PROCEDURE — XW033E5 INTRODUCTION OF REMDESIVIR ANTI-INFECTIVE INTO PERIPHERAL VEIN, PERCUTANEOUS APPROACH, NEW TECHNOLOGY GROUP 5: ICD-10-PCS | Performed by: INTERNAL MEDICINE

## 2023-11-26 PROCEDURE — 82962 GLUCOSE BLOOD TEST: CPT

## 2023-11-26 PROCEDURE — 258N000003 HC RX IP 258 OP 636: Performed by: INTERNAL MEDICINE

## 2023-11-26 PROCEDURE — 85025 COMPLETE CBC W/AUTO DIFF WBC: CPT | Performed by: INTERNAL MEDICINE

## 2023-11-26 PROCEDURE — 80053 COMPREHEN METABOLIC PANEL: CPT | Performed by: INTERNAL MEDICINE

## 2023-11-26 PROCEDURE — 36415 COLL VENOUS BLD VENIPUNCTURE: CPT | Performed by: INTERNAL MEDICINE

## 2023-11-26 PROCEDURE — 250N000013 HC RX MED GY IP 250 OP 250 PS 637: Performed by: STUDENT IN AN ORGANIZED HEALTH CARE EDUCATION/TRAINING PROGRAM

## 2023-11-26 PROCEDURE — 87040 BLOOD CULTURE FOR BACTERIA: CPT | Performed by: INTERNAL MEDICINE

## 2023-11-26 PROCEDURE — 71275 CT ANGIOGRAPHY CHEST: CPT

## 2023-11-26 PROCEDURE — 82310 ASSAY OF CALCIUM: CPT | Performed by: STUDENT IN AN ORGANIZED HEALTH CARE EDUCATION/TRAINING PROGRAM

## 2023-11-26 PROCEDURE — 84703 CHORIONIC GONADOTROPIN ASSAY: CPT | Performed by: INTERNAL MEDICINE

## 2023-11-26 PROCEDURE — 85610 PROTHROMBIN TIME: CPT | Performed by: INTERNAL MEDICINE

## 2023-11-26 PROCEDURE — 84145 PROCALCITONIN (PCT): CPT | Performed by: INTERNAL MEDICINE

## 2023-11-26 PROCEDURE — G0378 HOSPITAL OBSERVATION PER HR: HCPCS

## 2023-11-26 PROCEDURE — 99233 SBSQ HOSP IP/OBS HIGH 50: CPT | Performed by: INTERNAL MEDICINE

## 2023-11-26 PROCEDURE — 83735 ASSAY OF MAGNESIUM: CPT | Performed by: INTERNAL MEDICINE

## 2023-11-26 PROCEDURE — 250N000013 HC RX MED GY IP 250 OP 250 PS 637: Performed by: INTERNAL MEDICINE

## 2023-11-26 PROCEDURE — 74177 CT ABD & PELVIS W/CONTRAST: CPT

## 2023-11-26 PROCEDURE — 250N000011 HC RX IP 250 OP 636: Performed by: INTERNAL MEDICINE

## 2023-11-26 PROCEDURE — 86140 C-REACTIVE PROTEIN: CPT | Performed by: INTERNAL MEDICINE

## 2023-11-26 PROCEDURE — 85379 FIBRIN DEGRADATION QUANT: CPT | Performed by: INTERNAL MEDICINE

## 2023-11-26 PROCEDURE — 250N000011 HC RX IP 250 OP 636: Mod: JZ | Performed by: INTERNAL MEDICINE

## 2023-11-26 PROCEDURE — 83605 ASSAY OF LACTIC ACID: CPT | Performed by: INTERNAL MEDICINE

## 2023-11-26 PROCEDURE — 250N000012 HC RX MED GY IP 250 OP 636 PS 637: Performed by: INTERNAL MEDICINE

## 2023-11-26 PROCEDURE — 120N000001 HC R&B MED SURG/OB

## 2023-11-26 RX ORDER — PIPERACILLIN SODIUM, TAZOBACTAM SODIUM 3; .375 G/15ML; G/15ML
3.38 INJECTION, POWDER, LYOPHILIZED, FOR SOLUTION INTRAVENOUS ONCE
Status: COMPLETED | OUTPATIENT
Start: 2023-11-26 | End: 2023-11-26

## 2023-11-26 RX ORDER — DEXAMETHASONE SODIUM PHOSPHATE 4 MG/ML
6 INJECTION, SOLUTION INTRA-ARTICULAR; INTRALESIONAL; INTRAMUSCULAR; INTRAVENOUS; SOFT TISSUE EVERY MORNING
Status: DISCONTINUED | OUTPATIENT
Start: 2023-11-26 | End: 2023-11-27

## 2023-11-26 RX ORDER — PIPERACILLIN SODIUM, TAZOBACTAM SODIUM 3; .375 G/15ML; G/15ML
3.38 INJECTION, POWDER, LYOPHILIZED, FOR SOLUTION INTRAVENOUS EVERY 8 HOURS
Status: DISCONTINUED | OUTPATIENT
Start: 2023-11-26 | End: 2023-11-27

## 2023-11-26 RX ORDER — NICOTINE POLACRILEX 4 MG
15-30 LOZENGE BUCCAL
Status: DISCONTINUED | OUTPATIENT
Start: 2023-11-26 | End: 2023-11-27

## 2023-11-26 RX ORDER — DEXTROSE MONOHYDRATE 25 G/50ML
25-50 INJECTION, SOLUTION INTRAVENOUS
Status: DISCONTINUED | OUTPATIENT
Start: 2023-11-26 | End: 2023-11-27

## 2023-11-26 RX ORDER — DEXAMETHASONE SODIUM PHOSPHATE 4 MG/ML
6 INJECTION, SOLUTION INTRA-ARTICULAR; INTRALESIONAL; INTRAMUSCULAR; INTRAVENOUS; SOFT TISSUE DAILY
Status: DISCONTINUED | OUTPATIENT
Start: 2023-11-26 | End: 2023-11-26

## 2023-11-26 RX ORDER — MAGNESIUM SULFATE HEPTAHYDRATE 40 MG/ML
2 INJECTION, SOLUTION INTRAVENOUS ONCE
Status: COMPLETED | OUTPATIENT
Start: 2023-11-26 | End: 2023-11-26

## 2023-11-26 RX ORDER — PIPERACILLIN SODIUM, TAZOBACTAM SODIUM 3; .375 G/15ML; G/15ML
3.38 INJECTION, POWDER, LYOPHILIZED, FOR SOLUTION INTRAVENOUS EVERY 8 HOURS
Status: DISCONTINUED | OUTPATIENT
Start: 2023-11-26 | End: 2023-11-26

## 2023-11-26 RX ORDER — IBUPROFEN 600 MG/1
600 TABLET, FILM COATED ORAL ONCE
Status: COMPLETED | OUTPATIENT
Start: 2023-11-26 | End: 2023-11-26

## 2023-11-26 RX ORDER — NICOTINE POLACRILEX 4 MG
15-30 LOZENGE BUCCAL
Status: DISCONTINUED | OUTPATIENT
Start: 2023-11-26 | End: 2023-11-26

## 2023-11-26 RX ORDER — CEFTRIAXONE 1 G/1
1 INJECTION, POWDER, FOR SOLUTION INTRAMUSCULAR; INTRAVENOUS EVERY 24 HOURS
Status: DISCONTINUED | OUTPATIENT
Start: 2023-11-26 | End: 2023-11-26

## 2023-11-26 RX ORDER — IOPAMIDOL 755 MG/ML
90 INJECTION, SOLUTION INTRAVASCULAR ONCE
Status: COMPLETED | OUTPATIENT
Start: 2023-11-26 | End: 2023-11-26

## 2023-11-26 RX ORDER — SODIUM CHLORIDE 9 MG/ML
INJECTION, SOLUTION INTRAVENOUS CONTINUOUS
Status: DISCONTINUED | OUTPATIENT
Start: 2023-11-26 | End: 2023-11-29

## 2023-11-26 RX ORDER — DEXTROSE MONOHYDRATE 25 G/50ML
25-50 INJECTION, SOLUTION INTRAVENOUS
Status: DISCONTINUED | OUTPATIENT
Start: 2023-11-26 | End: 2023-11-26

## 2023-11-26 RX ORDER — ALBUTEROL SULFATE 90 UG/1
2 AEROSOL, METERED RESPIRATORY (INHALATION) EVERY 6 HOURS PRN
Status: DISCONTINUED | OUTPATIENT
Start: 2023-11-26 | End: 2023-11-30 | Stop reason: HOSPADM

## 2023-11-26 RX ORDER — ENOXAPARIN SODIUM 100 MG/ML
40 INJECTION SUBCUTANEOUS EVERY 24 HOURS
Status: DISCONTINUED | OUTPATIENT
Start: 2023-11-26 | End: 2023-11-30 | Stop reason: HOSPADM

## 2023-11-26 RX ADMIN — GABAPENTIN 300 MG: 300 CAPSULE ORAL at 08:26

## 2023-11-26 RX ADMIN — ACETAMINOPHEN 975 MG: 325 TABLET ORAL at 05:20

## 2023-11-26 RX ADMIN — DEXAMETHASONE SODIUM PHOSPHATE 6 MG: 4 INJECTION, SOLUTION INTRA-ARTICULAR; INTRALESIONAL; INTRAMUSCULAR; INTRAVENOUS; SOFT TISSUE at 08:26

## 2023-11-26 RX ADMIN — INSULIN ASPART 3 UNITS: 100 INJECTION, SOLUTION INTRAVENOUS; SUBCUTANEOUS at 08:31

## 2023-11-26 RX ADMIN — MAGNESIUM SULFATE HEPTAHYDRATE 2 G: 40 INJECTION, SOLUTION INTRAVENOUS at 08:25

## 2023-11-26 RX ADMIN — ENOXAPARIN SODIUM 40 MG: 40 INJECTION SUBCUTANEOUS at 09:15

## 2023-11-26 RX ADMIN — PIPERACILLIN AND TAZOBACTAM 3.38 G: 3; .375 INJECTION, POWDER, FOR SOLUTION INTRAVENOUS at 15:48

## 2023-11-26 RX ADMIN — SODIUM CHLORIDE 50 ML: 9 INJECTION, SOLUTION INTRAVENOUS at 10:40

## 2023-11-26 RX ADMIN — PIPERACILLIN AND TAZOBACTAM 3.38 G: 3; .375 INJECTION, POWDER, FOR SOLUTION INTRAVENOUS at 23:52

## 2023-11-26 RX ADMIN — GABAPENTIN 300 MG: 300 CAPSULE ORAL at 20:09

## 2023-11-26 RX ADMIN — SODIUM CHLORIDE 250 ML: 9 INJECTION, SOLUTION INTRAVENOUS at 09:14

## 2023-11-26 RX ADMIN — PIPERACILLIN AND TAZOBACTAM 3.38 G: 3; .375 INJECTION, POWDER, FOR SOLUTION INTRAVENOUS at 09:11

## 2023-11-26 RX ADMIN — GABAPENTIN 300 MG: 300 CAPSULE ORAL at 14:01

## 2023-11-26 RX ADMIN — SODIUM CHLORIDE: 9 INJECTION, SOLUTION INTRAVENOUS at 20:24

## 2023-11-26 RX ADMIN — REMDESIVIR 200 MG: 100 INJECTION, POWDER, LYOPHILIZED, FOR SOLUTION INTRAVENOUS at 10:03

## 2023-11-26 RX ADMIN — IOPAMIDOL 90 ML: 755 INJECTION, SOLUTION INTRAVENOUS at 09:06

## 2023-11-26 RX ADMIN — IBUPROFEN 600 MG: 600 TABLET ORAL at 06:44

## 2023-11-26 RX ADMIN — ACETAMINOPHEN 975 MG: 325 TABLET ORAL at 20:23

## 2023-11-26 RX ADMIN — SODIUM CHLORIDE: 9 INJECTION, SOLUTION INTRAVENOUS at 08:25

## 2023-11-26 RX ADMIN — HUMAN INSULIN 10 UNITS: 100 INJECTION, SUSPENSION SUBCUTANEOUS at 08:32

## 2023-11-26 ASSESSMENT — ACTIVITIES OF DAILY LIVING (ADL)
ADLS_ACUITY_SCORE: 35

## 2023-11-26 NOTE — H&P
[facility]    History and Physical - Hospitalist Service       Date of Admission:  11/25/2023    Assessment & Plan   Sandee Arroyo is a 38 year old female with a reported PMHx of a CVA associated with childbirth in 2007, asthma who presents with 3 days of fever, chills, body aches, and vomiting. Found to have UTI and COVID.    #Acute complicated UTI  Presented with 2-3 days of fever, chills, body aches, vomiting. Also with urinary frequency and found to have infectious-looking UA. UTI complicated by vitals of 100.3F and tachycardic to 130s but stable hemodynamics.  - S/p CTX 1g in ED; start levofloxacin PO 750mg daily x5 days on 11/26  - S/p 2L NS in ED  - Bcx 11/25: NGTD  - Ucx 11/25: NGTD  - If feeling well in the morning, can likely discharge on oral abx but will need to discuss diabetes care with patient    #Acute COVID-19 infection  Positive on admission. Some recent cough and congestion but denied any ongoing URI symptoms on admission. Complicated by UTI above, but not requiring supplemental O2.  - Monitor, should isolate after discharge    #Type 2 DM  #Anion-gap metabolic acidosis  #Mild DKA  BG elevated on admission to 396 with mild anion-gap metabolic acidosis. Likely a mild DKA in the setting of infection. Patient not on DM therapy but A1C here is 13.5.  - No insulin gtt for now, but need to monitor AG and treat infection.  - Start Lantus 15u at bedtime  - Start sliding scale insulin  - S/p 2L NS  - Should consider starting insulin at discharge, but at very least will need to start oral therapy    #Pseudohyponatremia  Normal when corrected for hyperglycemia  - Monitor    #Microcytic anemia  - Check ferritin    #History of asthma  - Not on long-acting inhalers    #History of CVA relating to childbirth  Occurred in 2007. Reportedly no significant residual deficits but some chronic neuropathic pain.  - Continue PTA gabapentin TID for residual pain         Observation Goals: -vital  "signs normal or at patient baseline, -tolerating oral intake to maintain hydration, -adequate pain control on oral analgesics, Nurse to notify provider when observation goals have been met and patient is ready for discharge.  Diet: Regular Diet Adult    DVT Prophylaxis: Pneumatic Compression Devices  Ardon Catheter: Not present  Lines: None     Cardiac Monitoring: None  Code Status: Full Code      Clinically Significant Risk Factors Present on Admission         # Hyponatremia: Lowest Na = 129 mmol/L in last 2 days, will monitor as appropriate              # DMII: A1C = 13.5 % (Ref range: <5.7 %) within past 6 months    # Obesity: Estimated body mass index is 31.89 kg/m  as calculated from the following:    Height as of this encounter: 1.6 m (5' 3\").    Weight as of this encounter: 81.6 kg (180 lb).              Disposition Plan      Expected Discharge Date: 11/26/2023                  RAFAL ARMSTRONG MD  Hospitalist Service  [facility]  Securely message with [employer] (more info)  Text page via [employer] Paging/Directory     ______________________________________________________________________    Chief Complaint   Fevers, chills, vomiting for 3 days    History is obtained from the patient    History of Present Illness   Sandee Arroyo is a 38 year old female with a reported PMHx of a CVA associated with childbirth in 2007, asthma who presents with 3 days of fever, chills, body aches, and vomiting. Noted urinary frequency as well. Also with cough and congestion but those are much better now.    In the ED the patient was febrile to 100.3F and HR 130s with normal BP, satting well on room air. On exam, mild TTP of lower abdomen but otherwise unremarkable. Lab work notable for AGMA with . UA with LE, many WBC, many bacteria. Hgb 10.1, MCV 75. WBC 11. COVID positive.      Past Medical History    No past medical history on file.    Past Surgical History   No past surgical history on " file.    Prior to Admission Medications   Prior to Admission Medications   Prescriptions Last Dose Informant Patient Reported? Taking?   gabapentin (NEURONTIN) 300 MG capsule 11/24/2023 at PM Self Yes Yes   Sig: Take 300 mg by mouth 3 times daily      Facility-Administered Medications: None           Physical Exam   Vital Signs: Temp: 100.3  F (37.9  C) Temp src: Oral BP: 108/56 Pulse: 105   Resp: 18 SpO2: 99 %      Weight: 180 lbs 0 oz    General Appearance: NAD, conversational  Respiratory: CTAB  Cardiovascular: RRR, no m/r/g  GI: Soft. ND. Mildly TTP over lower abdomen  Skin: No rashes on exposed skin  Ext: No peripheral edema    Medical Decision Making       60 MINUTES SPENT BY ME on the date of service doing chart review, history, exam, documentation & further activities per the note.      Data     I have personally reviewed the following data over the past 24 hrs:    11.0  \   10.1 (L)   / 232     129 (L) 94 (L) 16.7 /  335 (H)   3.8 18 (L) 0.76 \     TSH: N/A T4: N/A A1C: 13.5 (H)     Procal: N/A CRP: N/A Lactic Acid: 1.4         Imaging results reviewed over the past 24 hrs:   No results found for this or any previous visit (from the past 24 hour(s)).

## 2023-11-26 NOTE — PROGRESS NOTES
[facility]    Medicine Progress Note - Hospitalist Service    Date of Admission:  11/25/2023    Assessment & Plan     39 y/o female with asthma, prior hx of cva, dm uncontrolled new diagnosis, here with Covid, UTI, and concern sepsis    1.Fevers, 103, sepsis--bacteremia now--pos cx 11/25  -concern for sepsis from Urosepsis  -has abd pain, ?pyelo  -iv ceftriaxone  -blood cx  -tele  -get CT abd /pelvis  -check CTA pe run chest(need to check pregnancy test stat)    2.Urosepsis  -iv ceftriaxone--Switch to Zosyn now  -UC  -blood cx    3.Covid  -hx of vaccine x 1   -with risk asthma, uncontrolled DM, high risk for complications AND on 2 liters O2  -start dexamethasone now iv  -Remdesivir now, check LFT stat  -pulse ox  -precautions    4.DM-new Dx?  -she told me she had no idea  -FH pos DM in her mom  -A1c 13  -needs insulin and now complex with infections and need dex  -started lantus--increase to 20 units, sliding scale, and add carb count  -since we need Dex, will add dose NPH in am with steroid  -needs DM ed  -Nutrition to see, place on DM diet    5.Pseudohyponatremia  -from hyperglycemia    6.hx of asthma  -albuterol prn    7.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  -on ashley only     8.Low mag  -iv mag      DVT prevent- check D-dimer now  -likely Lovenox  -check CTA pe     CT Pe run--no pe  CT abd/pelvis--left pyelo          Diet: Regular Diet Adult    DVT Prophylaxis: Enoxaparin (Lovenox) SQ  Ardon Catheter: Not present  Lines: None     Cardiac Monitoring: ACTIVE order. Indication: tachy  Code Status: Full Code      Clinically Significant Risk Factors Present on Admission         # Hyponatremia: Lowest Na = 129 mmol/L in last 2 days, will monitor as appropriate    # Hypomagnesemia: Lowest Mg = 1.6 mg/dL in last 2 days, will replace as needed           # DMII: A1C = 13.5 % (Ref range: <5.7 %) within past 6 months    # Obesity: Estimated body mass index is 31.89 kg/m  as calculated from the  "following:    Height as of this encounter: 1.6 m (5' 3\").    Weight as of this encounter: 81.6 kg (180 lb).              Disposition Plan      Expected Discharge Date: 11/28/2023                    Lilibeth Reis MD  Hospitalist Service  [facility]  Securely message with [employer] (more info)  Text page via [employer] Paging/Directory   ______________________________________________________________________    Interval History   She is having chills now and fever now  She notes dysuria and abd pain   She notes some cough AND some pain with deep breath      Physical Exam   Vital Signs: Temp: (!) 103.3  F (39.6  C) Temp src: Oral BP: 118/71 Pulse: (!) 122   Resp: 18 SpO2: 100 % O2 Device: Nasal cannula Oxygen Delivery: 2 LPM  Weight: 180 lbs 0 oz    Constitutional: awake, fatigued, alert, cooperative, and no apparent distress  Respiratory: no increased work of breathing, moderate air exchange, no retractions, and clear to auscultation  Cardiovascular: tachycardic with regular rhythm and normal S1 and S2  GI: normal bowel sounds, soft, non-distended, and tenderness noted in the suprapubic region, in the right lower quadrant, and in the left lower quadrant  Skin: no bruising or bleeding  Musculoskeletal: no lower extremity pitting edema present  Neurologic: Mental Status Exam:  Level of Alertness:   awake  Neuropsychiatric: General: normal, calm, and normal eye contact    Medical Decision Making       55 MINUTES SPENT BY ME on the date of service doing chart review, history, exam, documentation & further activities per the note.      Data     I have personally reviewed the following data over the past 24 hrs:    11.0  \   10.1 (L)   / 232     129 (L) 94 (L) 16.7 /  335 (H)   3.8 18 (L) 0.76 \     ALT: 8 AST: 15 AP: 111 TBILI: 0.7   ALB: 3.8 TOT PROTEIN: 6.9 LIPASE: N/A     TSH: N/A T4: N/A A1C: 13.5 (H)     Procal: N/A CRP: N/A Lactic Acid: 1.4         Imaging results reviewed over the past 24 hrs: "   No results found for this or any previous visit (from the past 24 hour(s)).

## 2023-11-26 NOTE — PHARMACY-ADMISSION MEDICATION HISTORY
Pharmacist Admission Medication History    Admission medication history is complete. The information provided in this note is only as accurate as the sources available at the time of the update.    Information Source(s): Patient and Prescription bottles via in-person    Pertinent Information: patient brought rx pill bottle from previous pharmacy in [place].    Changes made to PTA medication list:  Added: gabapentin 300 mg TID  Deleted: None  Changed: None    Medication Affordability:  Not including over the counter (OTC) medications, was there a time in the past 3 months when you did not take your medications as prescribed because of cost?: No    Allergies reviewed with patient and updates made in EHR: yes    Medication History Completed By: Nicollette McMann, RPH 11/25/2023 6:43 PM    PTA Med List   Medication Sig Last Dose    gabapentin (NEURONTIN) 300 MG capsule Take 300 mg by mouth 3 times daily 11/24/2023 at PM

## 2023-11-26 NOTE — ED NOTES
Bed: JNED-31  Expected date:   Expected time:   Means of arrival:   Comments:  RM 21 with a SALLIE

## 2023-11-27 ENCOUNTER — APPOINTMENT (OUTPATIENT)
Dept: CT IMAGING | Facility: HOSPITAL | Age: 38
DRG: 871 | End: 2023-11-27
Attending: INTERNAL MEDICINE

## 2023-11-27 LAB
ALBUMIN SERPL BCG-MCNC: 3.3 G/DL (ref 3.5–5.2)
ALP SERPL-CCNC: 108 U/L (ref 40–150)
ALT SERPL W P-5'-P-CCNC: 11 U/L (ref 0–50)
ANION GAP SERPL CALCULATED.3IONS-SCNC: 8 MMOL/L (ref 7–15)
AST SERPL W P-5'-P-CCNC: 15 U/L (ref 0–45)
BILIRUB DIRECT SERPL-MCNC: <0.2 MG/DL (ref 0–0.3)
BILIRUB SERPL-MCNC: 0.3 MG/DL
BUN SERPL-MCNC: 13 MG/DL (ref 6–20)
CALCIUM SERPL-MCNC: 8.4 MG/DL (ref 8.6–10)
CHLORIDE SERPL-SCNC: 101 MMOL/L (ref 98–107)
CREAT SERPL-MCNC: 0.6 MG/DL (ref 0.51–0.95)
CRP SERPL-MCNC: 213.6 MG/L
D DIMER PPP FEU-MCNC: 1.25 UG/ML FEU (ref 0–0.5)
DEPRECATED HCO3 PLAS-SCNC: 23 MMOL/L (ref 22–29)
EGFRCR SERPLBLD CKD-EPI 2021: >90 ML/MIN/1.73M2
ERYTHROCYTE [DISTWIDTH] IN BLOOD BY AUTOMATED COUNT: 15.4 % (ref 10–15)
GLUCOSE BLDC GLUCOMTR-MCNC: 162 MG/DL (ref 70–99)
GLUCOSE BLDC GLUCOMTR-MCNC: 165 MG/DL (ref 70–99)
GLUCOSE BLDC GLUCOMTR-MCNC: 170 MG/DL (ref 70–99)
GLUCOSE BLDC GLUCOMTR-MCNC: 171 MG/DL (ref 70–99)
GLUCOSE BLDC GLUCOMTR-MCNC: 213 MG/DL (ref 70–99)
GLUCOSE BLDC GLUCOMTR-MCNC: 274 MG/DL (ref 70–99)
GLUCOSE BLDC GLUCOMTR-MCNC: 284 MG/DL (ref 70–99)
GLUCOSE BLDC GLUCOMTR-MCNC: 344 MG/DL (ref 70–99)
GLUCOSE SERPL-MCNC: 291 MG/DL (ref 70–99)
HCT VFR BLD AUTO: 32 % (ref 35–47)
HGB BLD-MCNC: 9.7 G/DL (ref 11.7–15.7)
MAGNESIUM SERPL-MCNC: 2.3 MG/DL (ref 1.7–2.3)
MCH RBC QN AUTO: 23 PG (ref 26.5–33)
MCHC RBC AUTO-ENTMCNC: 30.3 G/DL (ref 31.5–36.5)
MCV RBC AUTO: 76 FL (ref 78–100)
PLATELET # BLD AUTO: 251 10E3/UL (ref 150–450)
POTASSIUM SERPL-SCNC: 4 MMOL/L (ref 3.4–5.3)
PROT SERPL-MCNC: 6.7 G/DL (ref 6.4–8.3)
RBC # BLD AUTO: 4.22 10E6/UL (ref 3.8–5.2)
SODIUM SERPL-SCNC: 132 MMOL/L (ref 135–145)
WBC # BLD AUTO: 10.1 10E3/UL (ref 4–11)

## 2023-11-27 PROCEDURE — 99233 SBSQ HOSP IP/OBS HIGH 50: CPT | Performed by: INTERNAL MEDICINE

## 2023-11-27 PROCEDURE — 250N000013 HC RX MED GY IP 250 OP 250 PS 637: Performed by: STUDENT IN AN ORGANIZED HEALTH CARE EDUCATION/TRAINING PROGRAM

## 2023-11-27 PROCEDURE — 83735 ASSAY OF MAGNESIUM: CPT | Performed by: INTERNAL MEDICINE

## 2023-11-27 PROCEDURE — 70496 CT ANGIOGRAPHY HEAD: CPT

## 2023-11-27 PROCEDURE — 36415 COLL VENOUS BLD VENIPUNCTURE: CPT | Performed by: INTERNAL MEDICINE

## 2023-11-27 PROCEDURE — 86140 C-REACTIVE PROTEIN: CPT | Performed by: INTERNAL MEDICINE

## 2023-11-27 PROCEDURE — 250N000011 HC RX IP 250 OP 636: Mod: JZ | Performed by: STUDENT IN AN ORGANIZED HEALTH CARE EDUCATION/TRAINING PROGRAM

## 2023-11-27 PROCEDURE — 250N000011 HC RX IP 250 OP 636: Mod: JZ | Performed by: INTERNAL MEDICINE

## 2023-11-27 PROCEDURE — 99255 IP/OBS CONSLTJ NEW/EST HI 80: CPT | Performed by: INTERNAL MEDICINE

## 2023-11-27 PROCEDURE — 82248 BILIRUBIN DIRECT: CPT | Performed by: INTERNAL MEDICINE

## 2023-11-27 PROCEDURE — 250N000013 HC RX MED GY IP 250 OP 250 PS 637: Performed by: INTERNAL MEDICINE

## 2023-11-27 PROCEDURE — 258N000003 HC RX IP 258 OP 636: Performed by: INTERNAL MEDICINE

## 2023-11-27 PROCEDURE — G0508 CRIT CARE TELEHEA CONSULT 60: HCPCS | Performed by: PHYSICIAN ASSISTANT

## 2023-11-27 PROCEDURE — 85379 FIBRIN DEGRADATION QUANT: CPT | Performed by: INTERNAL MEDICINE

## 2023-11-27 PROCEDURE — 85027 COMPLETE CBC AUTOMATED: CPT | Performed by: INTERNAL MEDICINE

## 2023-11-27 PROCEDURE — 70498 CT ANGIOGRAPHY NECK: CPT

## 2023-11-27 PROCEDURE — 120N000001 HC R&B MED SURG/OB

## 2023-11-27 RX ORDER — IOPAMIDOL 755 MG/ML
75 INJECTION, SOLUTION INTRAVASCULAR ONCE
Status: COMPLETED | OUTPATIENT
Start: 2023-11-27 | End: 2023-11-27

## 2023-11-27 RX ORDER — NICOTINE POLACRILEX 4 MG
15-30 LOZENGE BUCCAL
Status: DISCONTINUED | OUTPATIENT
Start: 2023-11-27 | End: 2023-11-30 | Stop reason: HOSPADM

## 2023-11-27 RX ORDER — PIPERACILLIN SODIUM, TAZOBACTAM SODIUM 3; .375 G/15ML; G/15ML
3.38 INJECTION, POWDER, LYOPHILIZED, FOR SOLUTION INTRAVENOUS EVERY 8 HOURS
Status: DISCONTINUED | OUTPATIENT
Start: 2023-11-27 | End: 2023-11-27

## 2023-11-27 RX ORDER — DEXTROSE MONOHYDRATE 25 G/50ML
25-50 INJECTION, SOLUTION INTRAVENOUS
Status: DISCONTINUED | OUTPATIENT
Start: 2023-11-27 | End: 2023-11-30 | Stop reason: HOSPADM

## 2023-11-27 RX ORDER — CEFTRIAXONE 2 G/1
2 INJECTION, POWDER, FOR SOLUTION INTRAMUSCULAR; INTRAVENOUS EVERY 24 HOURS
Status: DISCONTINUED | OUTPATIENT
Start: 2023-11-27 | End: 2023-11-30 | Stop reason: HOSPADM

## 2023-11-27 RX ORDER — IBUPROFEN 600 MG/1
600 TABLET, FILM COATED ORAL EVERY 8 HOURS PRN
Status: COMPLETED | OUTPATIENT
Start: 2023-11-27 | End: 2023-11-28

## 2023-11-27 RX ORDER — IOPAMIDOL 755 MG/ML
75 INJECTION, SOLUTION INTRAVASCULAR ONCE
Status: DISCONTINUED | OUTPATIENT
Start: 2023-11-27 | End: 2023-11-30 | Stop reason: HOSPADM

## 2023-11-27 RX ADMIN — ACETAMINOPHEN 975 MG: 325 TABLET ORAL at 06:48

## 2023-11-27 RX ADMIN — REMDESIVIR 100 MG: 100 INJECTION, POWDER, LYOPHILIZED, FOR SOLUTION INTRAVENOUS at 08:16

## 2023-11-27 RX ADMIN — IOPAMIDOL 75 ML: 755 INJECTION, SOLUTION INTRAVENOUS at 15:26

## 2023-11-27 RX ADMIN — SODIUM CHLORIDE 50 ML: 9 INJECTION, SOLUTION INTRAVENOUS at 08:17

## 2023-11-27 RX ADMIN — IBUPROFEN 600 MG: 600 TABLET, FILM COATED ORAL at 23:04

## 2023-11-27 RX ADMIN — ACETAMINOPHEN 975 MG: 325 TABLET ORAL at 15:59

## 2023-11-27 RX ADMIN — SODIUM CHLORIDE 500 ML: 9 INJECTION, SOLUTION INTRAVENOUS at 16:03

## 2023-11-27 RX ADMIN — SODIUM CHLORIDE: 9 INJECTION, SOLUTION INTRAVENOUS at 18:44

## 2023-11-27 RX ADMIN — ONDANSETRON 4 MG: 2 INJECTION INTRAMUSCULAR; INTRAVENOUS at 22:31

## 2023-11-27 RX ADMIN — Medication 5 MG: at 23:04

## 2023-11-27 RX ADMIN — SODIUM CHLORIDE: 9 INJECTION, SOLUTION INTRAVENOUS at 08:20

## 2023-11-27 RX ADMIN — GABAPENTIN 300 MG: 300 CAPSULE ORAL at 08:17

## 2023-11-27 RX ADMIN — PIPERACILLIN AND TAZOBACTAM 3.38 G: 3; .375 INJECTION, POWDER, FOR SOLUTION INTRAVENOUS at 09:36

## 2023-11-27 RX ADMIN — CEFTRIAXONE SODIUM 2 G: 2 INJECTION, POWDER, FOR SOLUTION INTRAMUSCULAR; INTRAVENOUS at 18:05

## 2023-11-27 RX ADMIN — IBUPROFEN 600 MG: 600 TABLET, FILM COATED ORAL at 09:36

## 2023-11-27 ASSESSMENT — ACTIVITIES OF DAILY LIVING (ADL)
TOILETING_ISSUES: NO
FALL_HISTORY_WITHIN_LAST_SIX_MONTHS: NO
ADLS_ACUITY_SCORE: 20
ADLS_ACUITY_SCORE: 20
WALKING_OR_CLIMBING_STAIRS_DIFFICULTY: NO
ADLS_ACUITY_SCORE: 20
ADLS_ACUITY_SCORE: 20
CHANGE_IN_FUNCTIONAL_STATUS_SINCE_ONSET_OF_CURRENT_ILLNESS/INJURY: NO
WEAR_GLASSES_OR_BLIND: NO
DRESSING/BATHING_DIFFICULTY: NO
ADLS_ACUITY_SCORE: 20
CONCENTRATING,_REMEMBERING_OR_MAKING_DECISIONS_DIFFICULTY: NO
ADLS_ACUITY_SCORE: 20
ADLS_ACUITY_SCORE: 20
ADLS_ACUITY_SCORE: 35
DIFFICULTY_COMMUNICATING: NO
ADLS_ACUITY_SCORE: 20
ADLS_ACUITY_SCORE: 20
ADLS_ACUITY_SCORE: 35
DIFFICULTY_EATING/SWALLOWING: NO
DOING_ERRANDS_INDEPENDENTLY_DIFFICULTY: NO
ADLS_ACUITY_SCORE: 20
DEPENDENT_IADLS:: INDEPENDENT

## 2023-11-27 NOTE — SIGNIFICANT EVENT
Rapid called after checking in on pt at 1450. Last checked on pt at 1330 and she wanted to sleep and not eat lunch and wanted us to come back. When checking on pt she was very lethargic. Hard to arouse with sternal rub. VSS and .  Pt could open her eyes but then closed them instantly.  Able to lift arms but slow and weak.  Rapid was then changed to a stroke code. Pt went to CT of the head.  Tele stroke monitor brought to the room.  No MRI at this point and not to believed to be a stroke. Follow new orders per DR TORRE.  Juice given per Md order to elevated her BG.  500 NS bolus given.    All belonging moved to the pt's new room 128. 119 closed due to construction.  Air pods and phone present in room 128.

## 2023-11-27 NOTE — CONSULTS
Consultation - Infectious Disease  [facility]  Sandee Arroyo,  1985, MRN [id number]    Admitting Dx: Urinary tract infection without hematuria, site unspecified [N39.0]  COVID-19 virus infection [U07.1]    PCP: No Ref-Primary, Physician, None       ASSESSMENT   38-year-old woman with a history of asthma and previous CVA who is admitted with fevers and UTI symptoms.    Left-sided pyelonephritis.  Presenting with fevers and dysuria.  CT scan showing left-sided pyelonephritis.  Urine culture positive for E. coli  E. coli bacteremia.  2 of 2 blood cultures on admission growing E. coli.  Likely secondary to urinary tract infection.  Elevated procalcitonin.  Diabetes, new diagnosis.  Hemoglobin A1c 13.5  COVID infection.  Positive COVID screen on admission.  Some shortness of breath on admission.  Initially needed oxygen, but is currently on room air.  CT scan of the chest without infiltrates.  Started on remdesivir.  History of CVA.  Chronic appearing left frontal lobe lesion.  Stroke code called today, no signs of active stroke per neurology evaluation.  New headache this afternoon.    Principal Problem:    Urinary tract infection without hematuria, site unspecified  Active Problems:    COVID-19 virus infection       PLAN   -Discontinue Zosyn  -Start ceftriaxone  -Anticipate 2-week course of antibiotics, likely switch to p.o. when ready for discharge  -Discontinue dexamethasone  -Agree with remdesivir given high risk status.  If no worsening of respiratory status, would plan for 3-day course    Thank you for this consult. Will follow.    Salvatore Reynolds MD  [facility]  Direct messaging: [employer] Paging  On-Call ID provider: [phone], option: 9      ===========================================      Chief Complaint   Urinary tract infection without hematuria, site unspecified       HPI     We have been requested by Lilibeth Reis MD to evaluate Sandee Arroyo for  "the above.    History obtained by patient    Sandee Arroyo is a 38 year old woman with a history of asthma and previous CVA associated with pregnancy.  She presented with fevers and urinary tract infection symptoms.  She was found to have left-sided pyelonephritis.  Urine and blood culture are growing E. coli.  The patient has been started on antibiotics.  In addition she was diagnosed with COVID-19.  She denies any recent sick contacts.  She initially required oxygen and was started on dexamethasone.  Today she is breathing room air.  No infiltrate seen on CT scan of the chest.  Stroke code was called this afternoon after she developed headache and had decreased alertness.  She is tearful this afternoon and worried about her infection and her new diagnosis of diabetes.          Review of Systems   Ten systems reviewed and negative except for what is noted in the HPI         Medical History  See hpi above Surgical History  She  has no past surgical history on file.     Social History  Reviewed, and she    Social History     Social History Narrative    Not on file     Family History  family history is not on file.  family history reviewed and is not pertinent to the presenting problem.            Allergies   No Known Allergies      Antibiotics   Zosyn 11/26-  Remdesivir 11/26-    Previous:  Ceftriaxone 11/25      Physical Exam     Temp:  [97.8  F (36.6  C)-99.5  F (37.5  C)] 98.8  F (37.1  C)  Pulse:  [] 94  Resp:  [18-20] 18  BP: (106-135)/(62-83) 114/69  SpO2:  [98 %-100 %] 100 %    /69 (BP Location: Left arm)   Pulse 94   Temp 98.8  F (37.1  C) (Oral)   Resp 18   Ht 1.6 m (5' 3\")   Wt 81.6 kg (180 lb)   SpO2 100%   BMI 31.89 kg/m      GENERAL:  well-developed, well-nourished, lying in bed in no acute distress.   HENT:  Head is normocephalic, atraumatic. Oropharynx is moist without exudates or ulcers.  EYES:  Eyes have anicteric sclerae without conjunctival injection or stigmata of " endocarditis.   NECK:  Supple.  LUNGS:  Clear to auscultation.  CARDIOVASCULAR:  Regular rate and rhythm with no murmurs, gallops or rubs.  ABDOMEN:  Normal bowel sounds, soft, nontender. No appreciable hepatosplenomegaly  EXT: Extremities warm and without edema.  SKIN:  No acute rashes. No stigmata of endocarditis.  NEUROLOGIC:  Grossly nonfocal.      Cultures   11/25 urine culture: Greater than 100,000 colonies E. coli  11/25 blood cultures x 2: E. coli  11/26 blood cultures x 2: No growth to date    Susceptibility data from last 90 days.  Collected Specimen Info Organism Ampicillin Ampicillin/Sulbactam Cefazolin Cefepime Cefoxitin Ceftazidime Ceftriaxone Ciprofloxacin Gentamicin Levofloxacin Meropenem Nitrofurantoin Piperacillin/Tazobactam Tobramycin   11/25/23 Peripheral Blood Gram negative bacilli                 11/25/23 Peripheral Blood Escherichia coli  S  S   S   S  S  S  S  S  S   S  S   11/25/23 Urine, Midstream Escherichia coli  S  S  S  S  S  S  S  S  S  S   S  S  S     Collected Specimen Info Organism Trimethoprim/Sulfamethoxazole    11/25/23 Peripheral Blood Gram negative bacilli    11/25/23 Peripheral Blood Escherichia coli  S   11/25/23 Urine, Midstream Escherichia coli  S           Laboratory results     Recent Labs   Lab 11/27/23  0700 11/26/23  0704 11/25/23  1655   WBC 10.1 10.4 11.0   HGB 9.7* 8.8* 10.1*    226 232       Recent Labs   Lab 11/27/23  0700 11/26/23  0704 11/25/23  1655   * 132*  132* 129*   CO2 23 20*  20* 18*   BUN 13.0 12.9  12.9 16.7   ALBUMIN 3.3* 3.6 3.8   ALKPHOS 108 108 111   ALT 11 9 8   AST 15 16 15       Recent Labs   Lab 11/27/23  0700 11/26/23  0704   CRPI 213.60* 277.90*           Imaging   Radiology results reviewed    CTA Head Neck with Contrast    Result Date: 11/27/2023  HEAD AND NECK CT ANGIOGRAM WITH IV CONTRAST 11/27/2023 3:26 PM CST INDICATION: Stroke symptoms, confusion and aphasia, slurred speech, lethargy. TECHNIQUE: Head and neck CT  angiogram with IV contrast. Noncontrast head CT followed by axial helical CT images of the head and neck vessels obtained during the arterial phase of intravenous contrast administration. Axial helical 2D reconstructed images and multiplanar 3D MIP reconstructed images of the head and neck vessels were performed by the technologist. Dose reduction techniques were used. CONTRAST: 75ml isovue 370. COMPARISON: None. FINDINGS: NONCONTRAST HEAD CT: No intracranial hemorrhage, extraaxial collection, mass effect or CT evidence of acute infarct.  Moderate area of chronic encephalomalacia in the parasagittal left parietal lobe. Coarse nodular and linear calcification in the left frontal lobe. The ventricles and sulci are normal for age. Osseous structures are intact. The visualized paranasal sinuses are free of significant disease. The mastoid air cells are free of significant disease. The orbits are unremarkable. HEAD CTA: The intracranial circulation is patent without evidence for aneurysm, proximal vessel occlusion, high-grade intracranial stenosis or arteriovenous malformation. Patent dural venous sinuses. NECK CTA: Three vessel arch.  Carotid arteries are patent without atherosclerotic change.  No hemodynamically significant stenosis by NASCET criteria in either carotid system.  Patent vertebral arteries. No dissection.     CONCLUSION: HEAD CT: 1.  No intracranial hemorrhage, mass, or definite CT evidence of recent ischemia. 2.  Moderate area of chronic encephalomalacia in the parasagittal left parietal lobe. 3.  Left frontal lobe calcification appears chronic. It could reflect old injury or an underlying abnormality such as a cavernoma. Comparison with old imaging would be most useful. HEAD CTA: 1.  Negative. No vessel stenosis, occlusion or aneurysm. NECK CTA: 1.  Negative. No dissection or hemodynamically significant narrowing in the neck by NASCET criteria. Findings were discussed with PAOLO TORRE via  telephone at 1534 hours on 11/27/2023.    CT Abdomen Pelvis w Contrast    Result Date: 11/26/2023  EXAM: CT ABDOMEN PELVIS W CONTRAST LOCATION: [facility] DATE: 11/26/2023 INDICATION: 38 y o with urosepsis, check pyelo, also has COVID COMPARISON: None. TECHNIQUE: CT scan of the abdomen and pelvis was performed following injection of IV contrast. Multiplanar reformats were obtained. Dose reduction techniques were used. CONTRAST: isovue 370 90ml FINDINGS: LOWER CHEST: Normal. HEPATOBILIARY: Normal. PANCREAS: Normal. SPLEEN: Normal. ADRENAL GLANDS: Normal. KIDNEYS/BLADDER: Patchy enhancement of the left kidney with mild adjacent inflammatory stranding and fluid, correlate for pyelonephritis. No organized fluid collection. No hydroureteronephrosis. Ladder wall thickening consistent with cystitis. BOWEL: Large volume retained feces. No obstruction, colitis, or diverticulitis. Normal appendix. LYMPH NODES: Normal. VASCULATURE: Unremarkable. PELVIC ORGANS: Fibroid. MUSCULOSKELETAL: Normal.     IMPRESSION: 1.  Left pyelonephritis. No obstructing renal or ureteral stones. 2.  No obstruction, colitis, diverticulitis, or appendicitis. 3.  Calcified uterine fibroid.    CT Chest Pulmonary Embolism w Contrast    Result Date: 11/26/2023  EXAM: CT CHEST PULMONARY EMBOLISM W CONTRAST LOCATION: [facility] DATE: 11/26/2023 INDICATION: 38 y o wtih hx of asthma, COVID now, tachy, make sure no PE COMPARISON: None. TECHNIQUE: CT chest pulmonary angiogram during arterial phase injection of IV contrast. Multiplanar reformats and MIP reconstructions were performed. Dose reduction techniques were used. CONTRAST: isovue 370 90ml FINDINGS: ANGIOGRAM CHEST: Pulmonary arteries are normal caliber and negative for pulmonary emboli. Thoracic aorta is negative for dissection. No CT evidence of right heart strain. LUNGS AND PLEURA: Discoid atelectasis. No pneumothorax or pleural effusion.  MEDIASTINUM/AXILLAE: Right axilla within normal limits. CORONARY ARTERY CALCIFICATION: None. UPPER ABDOMEN: Please, see dedicated CT abdomen and pelvis performed same day. MUSCULOSKELETAL: Normal.     IMPRESSION: 1.  No pulmonary embolus, aortic dissection, or aneurysm. 2.  By lateral dependent atelectasis.      Data reviewed today: I reviewed all medications, new labs and imaging results over the last 24 hours. I personally reviewed the abdominal CT image(s) showing left sided pyelo .  The patient's care was discussed with the Bedside Nurse and Patient.

## 2023-11-27 NOTE — CONSULTS
DIABETES CARE    Situation:  Consulted by Provider for Diabetes Education  39 y/o female with asthma, prior hx of cva, dm uncontrolled new diagnosis, here with Covid, UTI, and concern sepsis     Background:  PCP: Moved here from [place] 1 1/2 weeks ago  Social: Lives with her 2 boys (16 and 8), sister and father    Diabetes History:   New, mom had diabetes, no longer alive    Current Inpatient Meds for BG Management:  20 units of Lantus bid  1:8 I:C ratio, Novolog to carb grams  Novolog correction scale: 1/25 over 140 at meals    Other Meds:   6 mg of Dexamethasone (equivalent to 40 mg of Prednisone) every am on hold    Labs:  Hemoglobin A1C: 13.5%  (estimated average  mg/dL)    GFR: >90 mL/min/1.73m^2    Blood Glucose POC:    Latest Reference Range & Units 11/26/23 07:47 11/26/23 12:32 11/26/23 17:02 11/26/23 18:48 11/26/23 22:04 11/27/23 02:06 11/27/23 06:52 11/27/23 08:13   GLUCOSE BY METER POCT 70 - 99 mg/dL 285 (H) 328 (H) 254 (H) 291 (H) 330 (H) 274 (H) 284 (H) 344 (H)   (H): Data is abnormally high    Diet Order: 60 grams CHO   Intake: 100%   Weight: 81.6 kg    BMI: 31.89 kg/m^2    DM EDUCATION/COUNSELING:  Barriers to Learning and/or DM Self-Management: Overwhelmed, teary  Current education   Educated/reviewed diabetes basics: pathophysiology, hyperglycemia, long term complications, treatment.  Educated/reviewed hypoglycemia: symptoms, causes, treatment.  Explained normal/goals of blood sugar control, A1C, when to call provider.Gave her a voucher for a free meter. She will buy the strips for it at [employer]. Also gave her a lancet device and many lancets. I also educated her on the CGMS possibility once she has insurance.   Educated/reviewed use of home glucose meter, [employer] Premier Classic meter demo'd as she has no insurance.  Will have financial counselor speak with her. patient able to demonstrate its use.  Educated on normal pancreas function, how oral medications work, GLP1 meds if ordered, and  "need for basal insulin and mealtime insulin at this time.  Also, should test for type 1 diabetes.  Specific insulins were explained including how they each acted on blood sugar, their timing, and differences in dosing.   The insulin pen technique was demonstrated by patient but she was unable to do the injection \"at this time\". States her sister will help her.  I suggested while she is here that she start giving herself injections so she gets comfortable with the steps..   Discussed injection site rotation, proper storage, and safe needle disposal.   I will give her a coupon for Basaglar and Humalog insulin pens, $35 /box with coupon so then hopefully she will be able to get on some insurance soon.   Carbohydrates were briefly discussed and their effect on BG. Reinforced healthy eating. RD to see here.    Written handouts given on all education provided.    Assessment:  Patient feeling overwhelmed, anxious about insulin injection    Recommendations:  1. If patient is not to be on dexamethasone, consider Lantus only once daily.   2. With present basal total, I:C ratio would be 1:6 based on Rule of 500  3. If steroid to restart, see steroid guidelines in Resources, RX Resources, Glucose Management  4. Could consider ordering some/all of the following antibody tests to assess for type 1 diabetes: MOISES, ICA, IA-2, IAA, and ZN-T8.     Refer to \"Guidelines for Insulin Initiation and Care in Hospitalized Adults\" link in Diabetes Management Order set for dosing guidelines.    Hospital goals for blood glucose levels are < 180 mg/dL for improved health outcomes.    F/U Plans:  Establish with a PCP in area, CDE once that is done OP would be good  Insurance application    DISCHARGE NEEDS:  RX needed at [place] (preferred by patient's insurance):  Will get free meter at [facility] with voucher, buy strips and more lancets there as needed  Basaglar Kwikpens, box of 5 with coupon and rx $35  Humalog Kwikpens, box of 5 with coupon and rx " $35  [employer] ReliOn pen needles, ($9 for 50) 4 mm, 32 gauge  Insulin requiring  E11.65  To test 4x daily      Thank you,     Ginny Nelson RN, Certified Diabetes Care and     [address] [address]  [email or web address]   Office: [phone]  Pager: [phone]

## 2023-11-27 NOTE — PROGRESS NOTES
[facility]    Medicine Progress Note - Hospitalist Service    Date of Admission:  11/25/2023    Assessment & Plan   37 y/o female with asthma, prior hx of cva, dm uncontrolled new diagnosis, here with Covid, UTI, and concern sepsis    1.Fevers, 103, sepsis--bacteremia now--pos cx 11/25  -concern for sepsis from Urosepsis  -has abd pain, L sided pyelonephritis with E.coli bacteremia   -iv Zosyn  -blood cx  -tele  -CT abd /pelvis--showed pyelo on left--no stone    2.Urosepsis--E.coli  -iv ceftriaxone--Switch to Zosyn now  -UC  -blood cx--pos and Uc match  -ID to see for duration    3.Covid infection  -hx of vaccine x 1  back in 2021  -with risk asthma, uncontrolled DM, high risk for complications AND on 2 liters O2  -start dexamethasone on admit when on O2--now off O2 hold   -Remdesivir  -pulse ox  -precautions    4.DM-new Dx?  -she told me she had no idea about this  -FH pos DM in her mom  -A1c 13 no admit  -needs insulin and now complex with infections and need dex  -started lantus--increase to 20 units q am 11/27, sliding scale, and add carb count  -since we need Dex, will add dose NPH in am with steroid--hold dex and NPH  -needs DM ed  -Nutrition to see, place on DM diet    5.Pseudohyponatremia  -from hyperglycemia    6.hx of asthma  -albuterol prn    7.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--preeclampsia severe--had emergency c/s and she thinks it was a hemorrhagic cva  -on ashley only     8.Low mag  -iv mag was given on admit    9.Headache- Ibuprofen prn      DVT prevent-lovenox    Social [place], no insurance, get care management to see and assist here              Diet: Moderate Consistent Carb (60 g CHO per Meal) Diet    DVT Prophylaxis: Enoxaparin (Lovenox) SQ  Ardon Catheter: Not present  Lines: None     Cardiac Monitoring: ACTIVE order. Indication: sepsis  Code Status: Full Code      Clinically Significant Risk Factors         # Hyponatremia: Lowest Na = 129 mmol/L  "in last 2 days, will monitor as appropriate  # Hypocalcemia: Lowest Ca = 8.2 mg/dL in last 2 days, will monitor and replace as appropriate   # Hypomagnesemia: Lowest Mg = 1.6 mg/dL in last 2 days, will replace as needed   # Hypoalbuminemia: Lowest albumin = 3.3 g/dL at 11/27/2023  7:00 AM, will monitor as appropriate  # Coagulation Defect: INR = 1.27 (Ref range: 0.85 - 1.15) and/or PTT = N/A, will monitor for bleeding          # DMII: A1C = 13.5 % (Ref range: <5.7 %) within past 6 months, PRESENT ON ADMISSION  # Obesity: Estimated body mass index is 31.89 kg/m  as calculated from the following:    Height as of this encounter: 1.6 m (5' 3\").    Weight as of this encounter: 81.6 kg (180 lb)., PRESENT ON ADMISSION            Disposition Plan      Expected Discharge Date: 11/29/2023    Discharge Delays: IV Medication - consider oral or Home Infusion                Lilibeth Reis MD  Hospitalist Service  [facility]  Securely message with [employer] (more info)  Text page via [employer] Paging/Directory   ______________________________________________________________________    Interval History   She thinks cough is better  No sob  Off O2  Some side pain still  Headache still  A little tearful about all of it today  DM still a shock to her      Physical Exam   Vital Signs: Temp: 99.5  F (37.5  C) Temp src: Oral BP: 106/64 Pulse: 91   Resp: 18 SpO2: 98 % O2 Device: None (Room air)    Weight: 180 lbs 0 oz    Constitutional: awake, alert, cooperative, and no apparent distress  Respiratory: No increased work of breathing, good air exchange, clear to auscultation bilaterally, no crackles or wheezing  Cardiovascular: Normal apical impulse, regular rate and rhythm, normal S1 and S2, no S3 or S4, and no murmur noted  GI: normal bowel sounds, soft, non-distended, and non-tender  Skin: no bruising or bleeding  Musculoskeletal: no lower extremity pitting edema present  Neurologic: Mental Status Exam:  Level of " Alertness:   awake  Neuropsychiatric: General: normal, calm, and normal eye contact  Affect: tearful    Medical Decision Making       55 MINUTES SPENT BY ME on the date of service doing chart review, history, exam, documentation & further activities per the note.      Data     I have personally reviewed the following data over the past 24 hrs:    10.1  \   9.7 (L)   / 251     132 (L) 101 13.0 /  344 (H)   4.0 23 0.60 \     ALT: 11 AST: 15 AP: 108 TBILI: 0.3   ALB: 3.3 (L) TOT PROTEIN: 6.7 LIPASE: N/A     Procal: N/A CRP: 213.60 (H) Lactic Acid: 1.0       INR:  N/A PTT:  N/A   D-dimer:  1.25 (H) Fibrinogen:  N/A     Ferritin:  N/A % Retic:  N/A LDH:  N/A       Imaging results reviewed over the past 24 hrs:   Recent Results (from the past 24 hour(s))   CT Chest Pulmonary Embolism w Contrast    Narrative    EXAM: CT CHEST PULMONARY EMBOLISM W CONTRAST  LOCATION: [facility]  DATE: 11/26/2023    INDICATION: 38 y o wtih hx of asthma, COVID now, tachy, make sure no PE  COMPARISON: None.  TECHNIQUE: CT chest pulmonary angiogram during arterial phase injection of IV contrast. Multiplanar reformats and MIP reconstructions were performed. Dose reduction techniques were used.   CONTRAST: isovue 370 90ml    FINDINGS:  ANGIOGRAM CHEST: Pulmonary arteries are normal caliber and negative for pulmonary emboli. Thoracic aorta is negative for dissection. No CT evidence of right heart strain.    LUNGS AND PLEURA: Discoid atelectasis. No pneumothorax or pleural effusion.    MEDIASTINUM/AXILLAE: Right axilla within normal limits.    CORONARY ARTERY CALCIFICATION: None.    UPPER ABDOMEN: Please, see dedicated CT abdomen and pelvis performed same day.    MUSCULOSKELETAL: Normal.      Impression    IMPRESSION:  1.  No pulmonary embolus, aortic dissection, or aneurysm.  2.  By lateral dependent atelectasis.   CT Abdomen Pelvis w Contrast    Narrative    EXAM: CT ABDOMEN PELVIS W CONTRAST  LOCATION: [facility]  [facility]  DATE: 11/26/2023    INDICATION: 38 y o with urosepsis, check pyelo, also has COVID  COMPARISON: None.  TECHNIQUE: CT scan of the abdomen and pelvis was performed following injection of IV contrast. Multiplanar reformats were obtained. Dose reduction techniques were used.  CONTRAST: isovue 370 90ml    FINDINGS:   LOWER CHEST: Normal.    HEPATOBILIARY: Normal.    PANCREAS: Normal.    SPLEEN: Normal.    ADRENAL GLANDS: Normal.    KIDNEYS/BLADDER: Patchy enhancement of the left kidney with mild adjacent inflammatory stranding and fluid, correlate for pyelonephritis. No organized fluid collection. No hydroureteronephrosis. Ladder wall thickening consistent with cystitis.    BOWEL: Large volume retained feces. No obstruction, colitis, or diverticulitis. Normal appendix.    LYMPH NODES: Normal.    VASCULATURE: Unremarkable.    PELVIC ORGANS: Fibroid.    MUSCULOSKELETAL: Normal.      Impression    IMPRESSION:   1.  Left pyelonephritis. No obstructing renal or ureteral stones.  2.  No obstruction, colitis, diverticulitis, or appendicitis.  3.  Calcified uterine fibroid.

## 2023-11-27 NOTE — CONSULTS
CLINICAL NUTRITION NOTE  Received consult for new DM diet ed    Unable to enter room d/t pt in [facility]  Attempted to reach pt on room phone and her cell phone around 1300 without success.     Handouts were sent to room ahead of call to room: Carbohydrate counting for people with DM and Using a nutrition label; carbohydrates    Pt now with RRT and stroke code. Not appropriate for diet ed today    Will attempt to provide new DM diet ed tomorrow

## 2023-11-27 NOTE — PLAN OF CARE
Problem: Pain Acute  Goal: Optimal Pain Control and Function  11/26/2023 1930 by Reid Eckert RN  Outcome: Progressing  11/26/2023 1929 by Reid Eckert RN  Outcome: Progressing     Problem: Infection  Goal: Absence of Infection Signs and Symptoms  11/26/2023 1930 by Reid Eckert RN  Outcome: Progressing  11/26/2023 1929 by Reid Eckert RN  Outcome: Progressing     Problem: Comorbidity Management  Goal: Blood Glucose Levels Within Targeted Range  Outcome: Progressing     Problem: Adult Inpatient Plan of Care  Goal: Absence of Hospital-Acquired Illness or Injury  Intervention: Prevent Skin Injury  Recent Flowsheet Documentation  Taken 11/26/2023 1600 by Reid Eckert RN  Body Position: position changed independently  Taken 11/26/2023 1300 by Reid Eckert RN  Body Position: position changed independently  Intervention: Prevent Infection  Recent Flowsheet Documentation  Taken 11/26/2023 1600 by Reid Eckert RN  Infection Prevention: hand hygiene promoted  Taken 11/26/2023 1400 by Reid Eckert RN  Infection Prevention: hand hygiene promoted   Goal Outcome Evaluation:       Patient is A/O x4. VSS. Patient denies pain. BG in 200's and 300's this shift. Patient BG treated with sliding scale and CC insulin. Basic teaching on insulin coverage given, but patient could used continued teaching throughout her stay here. Independent in room.

## 2023-11-27 NOTE — PLAN OF CARE
Goal Outcome Evaluation:    Alert and oriented.  Tolerating diet.   DM educator seen pt today. Pt seem anxious about the insulin shots.  No complaints of pain.  RA lungs are clear. No shortness of breath.  Headache. Ibuprofen administered.  Independent in the room.

## 2023-11-27 NOTE — DISCHARGE INSTRUCTIONS
Diabetes Care:  1. Check blood sugar 4 x daily, goals before meals  (if check two hours after the start of meals goal is < 180).   2. Call doctor (once establish with a primary care provider)  if 2 or more unexplained low blood sugars in a week or if blood sugar is over 400  3. Ask your primary care provider for a referral to see an outpatient diabetes educator to further diabetes management education.  4. Follow insulin regimen on discharge orders until able to see provider where doses may be adjusted based on blood sugar patterns.

## 2023-11-27 NOTE — PLAN OF CARE
Problem: Adult Inpatient Plan of Care  Goal: Plan of Care Review  Description: The Plan of Care Review/Shift note should be completed every shift.  The Outcome Evaluation is a brief statement about your assessment that the patient is improving, declining, or no change.  This information will be displayed automatically on your shift  note.  Outcome: Progressing     Problem: Adult Inpatient Plan of Care  Goal: Optimal Comfort and Wellbeing  Outcome: Progressing     Problem: Adult Inpatient Plan of Care  Goal: Readiness for Transition of Care  Outcome: Progressing     Problem: Comorbidity Management  Goal: Blood Glucose Levels Within Targeted Range  Outcome: Progressing     Problem: Infection  Goal: Absence of Infection Signs and Symptoms  Outcome: Progressing   Goal Outcome Evaluation:       Pt has new Dx of DM. Pt educated on insulins and administration technique. Pt could use further teaching r/t food selection to help control BS. She requested from CNA to have dileep crackers as snack choice. BS are 200's-300's currently and not controlled. Pt is on 60g carb diet. Encouraged to push fluids to help with UTI. VS otherwise stable. C/O pain to lower right abdomen r/t injections for which prn Tylenol was administered; effective. On IV ABX therapy. Continues on tele.       0650 Pt has temp of 99.5. She is tearful and shaking. Pt states she always shakes when she gets a fever.  . Pt ordered breakfast.

## 2023-11-27 NOTE — CONSULTS
Care Management Initial Consult    General Information  Assessment completed with: Patient,    Type of CM/SW Visit: Initial Assessment    Primary Care Provider verified and updated as needed:  (no pcp, no insurance)   Readmission within the last 30 days: no previous admission in last 30 days         Advance Care Planning: Advance Care Planning Reviewed:  (no HCD)          Communication Assessment  Patient's communication style: spoken language (English or Bilingual)    Hearing Difficulty or Deaf: no        Cognitive  Cognitive/Neuro/Behavioral: WDL  Level of Consciousness: alert  Arousal Level: opens eyes spontaneously  Orientation: oriented x 4  Mood/Behavior: calm, cooperative  Best Language: 0 - No aphasia  Speech: clear, logical    Living Environment:   People in home: sibling(s)     Current living Arrangements: house      Able to return to prior arrangements: yes       Family/Social Support:  Care provided by: self  Provides care for: no one     Sibling(s)          Description of Support System: Supportive, Involved         Current Resources:   Patient receiving home care services: No     Community Resources: None  Equipment currently used at home: none  Supplies currently used at home: None    Employment/Financial:  Employment Status: unemployed        Financial Concerns: insurance, none   Referral to Financial Worker: Yes       Does the patient's insurance plan have a 3 day qualifying hospital stay waiver?  No    Lifestyle & Psychosocial Needs:  Social Determinants of Health     Food Insecurity: Not on file   Depression: Not on file   Housing Stability: Not on file   Tobacco Use: Not on file   Financial Resource Strain: Not on file   Alcohol Use: Not on file   Transportation Needs: Not on file   Physical Activity: Not on file   Interpersonal Safety: Not on file   Stress: Not on file   Social Connections: Not on file       Functional Status:  Prior to admission patient needed assistance:   Dependent ADLs::  Independent  Dependent IADLs:: Independent         Additional Information:    Assessment completed with patient over the phone. Patient reports she recently moved here from [place]. She is living with her brother Raymond in his town house. She is independent with ADLs and IADLs, ambulates without devices.  She is not employed and has no insurance. Patient states her sister Kaela is her primary family contact. Family willing to transport at discharge.    Referral to Financial SW for lack of insurance.    Patient may need [facility] for medications.        Susan Pagan RN

## 2023-11-27 NOTE — PROGRESS NOTES
I responded to Rapid response, which I requested stroke code    RN notes they checked on her at 1330 and seemed fine  Then at 1450 checked on her and she did not seem as responsive  When I arrived she was much more sleepy and not talking much Code stroke was called    CTA done and old cva seen, no new lesions and vessels ok    Upon return from CT, more detailed neuro exam was done with stroke neuro, and I remained in room    She was more awake and tearful  She followed commands  Voice was soft, but she clearly answered appropriately  CN grossly intact  Motor, hand  per my own exam 4/5  Finger to nose, and heel to shin intact    A/P  -per stroke neurology not likely stroke, stroke neurology ok with lovenox for dvt prevention   -I suspect as BS lowering, some headache and related to that, as well as covid, and bacteremia  -plan on allowing higher BS today--will do custom sliding scale and hold carb counting, decrease lantus, goal BS keep >200 today   -get neurology consult to see here and get eeg  - I called sister to update

## 2023-11-27 NOTE — CONSULTS
[facility]    Stroke Consult Note    Reason for Consult: Stroke Code     Chief Complaint: Fever      HPI  Sandee Arroyo is a 38 year old female with pertinent past medical history of self-reported stroke in , records not available in our system, just moved here from [place] 1 week ago.     She presented to [facility] ED 23 with fever, body aches, fatigue, headaches, found to have COVID-19 and pyelonephritis as well as new diagnosis of diabetes with A1c 13%. She was started on Remdesivir, dexamethasone, zosyn and insulin. She was also given subcutaneous heparin for VTE ppx. D-dimer elevated 1.35, .9,     Today she was LKW at 1330. At 1450 she was found by RN with decreased responsiveness, stating that her head hurt. She was slow to move but appeared to have symmetric strength. Appears lethargic. RRT was called and hospitalist assessed and no clear aphasia or focal weakness. Did not receive any opioids. CT scan shows a large chronic infarct to L parietal lobe. She states that she had a hemorrhagic stroke due to severe HTN/pre-eclampsia and required emergent .    She was seen via telemedicine for stroke code. She denies any feeling of weakness on exam. There was no drift but RN felt possibly slight decreased R  strength, patient did not appreciate this and in setting of old L parietal infarct may have some residual weakness.    Imaging Findings  HEAD CT:  1.  No intracranial hemorrhage, mass, or definite CT evidence of recent ischemia.  2.  Moderate area of chronic encephalomalacia in the parasagittal left parietal lobe.  3.  Left frontal lobe calcification appears chronic. It could reflect old injury or an underlying abnormality such as a cavernoma. Comparison with old imaging would be most useful.     HEAD CTA:  1.  Negative. No vessel stenosis, occlusion or aneurysm.     NECK CTA:  1.  Negative. No dissection or hemodynamically significant narrowing in  "the neck by NASCET criteria.    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      Zari Galo PA-C  Vascular Neurology    To page me or covering stroke neurology team member, click here: [facility]  Choose \"On Call\" tab at top, then select \"NEUROLOGY/ALL SITES\" from middle drop-down box, press Enter, then look for \"stroke\" or \"telestroke\" for your site.  ______________________________________________________    Clinically Significant Risk Factors       # Coagulation Defect: INR = 1.27 (Ref range: 0.85 - 1.15) and/or PTT = N/A, will monitor for bleeding          # DMII: A1C = 13.5 % (Ref range: <5.7 %) within past 6 months, PRESENT ON ADMISSION  # Obesity: Estimated body mass index is 31.89 kg/m  as calculated from the following:    Height as of this encounter: 1.6 m (5' 3\").    Weight as of this encounter: 81.6 kg (180 lb)., PRESENT ON ADMISSION              Past Medical History   No past medical history on file.  Past Surgical History   No past surgical history on file.  Medications   Home Meds  Prior to Admission medications    Medication Sig Start Date End Date Taking? Authorizing Provider   gabapentin (NEURONTIN) 300 MG capsule Take 300 mg " by mouth 3 times daily   Yes Unknown, Entered By History       Scheduled Meds   [Held by provider] dexAMETHasone  6 mg Intravenous QAM    enoxaparin ANTICOAGULANT  40 mg Subcutaneous Q24H    gabapentin  300 mg Oral TID    insulin aspart  1-7 Units Subcutaneous TID AC    insulin aspart   Subcutaneous TID w/meals    insulin glargine  15 Units Subcutaneous BID    [Held by provider] insulin NPH  10 Units Subcutaneous QAM AC    iopamidol (ISOVUE-370)  75 mL Intravenous Once    piperacillin-tazobactam  3.375 g Intravenous Q8H    remdesivir  100 mg Intravenous Q24H    And    sodium chloride 0.9%  50 mL Intravenous Q24H    sodium chloride 0.9%  500 mL Intravenous Once       Infusion Meds   sodium chloride 50 mL/hr at 11/27/23 0937       PRN Meds  acetaminophen, albuterol, glucose **OR** dextrose **OR** glucagon, ibuprofen, melatonin, ondansetron **OR** ondansetron, polyethylene glycol, prochlorperazine **OR** prochlorperazine **OR** prochlorperazine, senna-docusate **OR** senna-docusate    Allergies   No Known Allergies  Family History   No family history on file.  Social History        Review of Systems   The 10 point Review of Systems is negative other than noted in the HPI or here.        PHYSICAL EXAMINATION  Temp:  [97.8  F (36.6  C)-99.5  F (37.5  C)] 98.7  F (37.1  C)  Pulse:  [] 93  Resp:  [18-20] 18  BP: (105-135)/(62-83) 124/75  SpO2:  [97 %-100 %] 100 %     General Exam  General:  patient lying in bed without any acute distress    HEENT:  normocephalic/atraumatic  Pulmonary:  appears dyspneic      Neuro Exam  Mental Status:  alert, oriented x 3, follows commands, naming and repetition normal,  some slowed responses and soft spoken but able to be understood without issue, appears encephalopathic   Cranial Nerves:  visual fields intact (tested by nurse), EOMI with normal smooth pursuit, facial sensation intact and symmetric (tested by nurse), facial movements symmetric, hearing not formally tested but intact  to conversation, no dysarthria, tongue protrusion midline  Motor:  no abnormal movements, able to move all limbs antigravity spontaneously with no signs of hemiparesis observed, no pronator drift RN felt possibly slight decreased R  strength, patient did not appreciate this   Reflexes:  unable to test (telestroke)  Sensory:  light touch sensation intact and symmetric throughout upper and lower extremities (assessed by nurse), no extinction on double simultaneous stimulation (assessed by nurse)  Coordination:  normal finger-to-nose and heel-to-shin bilaterally without dysmetria  Station/Gait:  unable to test due to telestroke    Dysphagia Screen  Per Nursing    Stroke Scales    NIHSS  1a. Level of Consciousness 0-->Alert, keenly responsive   1b. LOC Questions 0-->Answers both questions correctly   1c. LOC Commands 0-->Performs both tasks correctly   2.   Best Gaze 0-->Normal   3.   Visual 0-->No visual loss   4.   Facial Palsy 0-->Normal symmetrical movements   5a. Motor Arm, Left 0-->No drift, limb holds 90 (or 45) degrees for full 10 secs   5b. Motor Arm, Right 0-->No drift, limb holds 90 (or 45) degrees for full 10 secs   6a. Motor Leg, Left 0-->No drift, leg holds 30 degree position for full 5 secs   6b. Motor Leg, right 0-->No drift, leg holds 30 degree position for full 5 secs   7.   Limb Ataxia 0-->Absent   8.   Sensory 0-->Normal, no sensory loss   9.   Best Language (S) 1-->Mild-to-moderate aphasia, some obvious loss of fluency or facility of comprehension, without significant limitation on ideas expressed or form of expression. Reduction of speech and/or comprehension, however, makes conversation. . . (see row details) (some slowed responses and soft spoken but able to be understood without issue, appears encephalopathic)   10. Dysarthria 0-->Normal   11. Extinction and Inattention  0-->No abnormality   Total 1 (11/27/23 8801)       Imaging  I personally reviewed all imaging; relevant findings per HPI.  "    Lab Results Data   CBC  Recent Labs   Lab 11/27/23  0700 11/26/23  0704 11/25/23  1655   WBC 10.1 10.4 11.0   RBC 4.22 3.82 4.40   HGB 9.7* 8.8* 10.1*   HCT 32.0* 28.6* 32.9*    226 232     Basic Metabolic Panel    Recent Labs   Lab 11/27/23  1544 11/27/23  1454 11/27/23  0813 11/27/23  0700 11/26/23  0747 11/26/23  0704 11/25/23  2156 11/25/23  1655   NA  --   --   --  132*  --  132*  132*  --  129*   POTASSIUM  --   --   --  4.0  --  4.1  4.1  --  3.8   CHLORIDE  --   --   --  101  --  98  98  --  94*   CO2  --   --   --  23  --  20*  20*  --  18*   BUN  --   --   --  13.0  --  12.9  12.9  --  16.7   CR  --   --   --  0.60  --  0.68  0.68  --  0.76   * 162* 344* 291*   < > 311*  311*   < > 396*   ANGIE  --   --   --  8.4*  --  8.2*  8.2*  --  8.8    < > = values in this interval not displayed.     Liver Panel  Recent Labs   Lab 11/27/23  0700 11/26/23  0704 11/25/23  1655   PROTTOTAL 6.7 6.6 6.9   ALBUMIN 3.3* 3.6 3.8   BILITOTAL 0.3 0.6 0.7   ALKPHOS 108 108 111   AST 15 16 15   ALT 11 9 8     INR    Recent Labs   Lab Test 11/26/23  0704   INR 1.27*      Lipid Profile  No lab results found.  A1C    Recent Labs   Lab Test 11/25/23  1655   A1C 13.5*     Troponin  No results for input(s): \"CTROPT\", \"TROPONINIS\", \"TROPONINI\", \"GHTROP\" in the last 168 hours.       Stroke Code Data Data   Stroke Code Data  (for stroke code with tele)  Stroke code activated 11/27/23  1505   First stroke provider response 11/27/23  1507   Video start time 11/27/23  1535   Video end time 11/27/23  1549   Last known normal 11/27/23  1330   Time of discovery (or onset of symptoms)  11/27/23  1450   Head CT read by Stroke Neuro Provider 11/27/23  1523   Was stroke code de-escalated? Yes  11/27/23  1550     Telestroke Service Details  Type of service telemedicine diagnostic assessment of acute neurological changes   Reason telemedicine is appropriate patient requires assessment with a specialist for diagnosis and " treatment of neurological symptoms   Mode of transmission secure interactive audio and video communication per Raciel   Originating site (patient location) [facility]    Distant site (provider location) [facility]       I personally examined and evaluated the patient today. At the time of my evaluation and management the patient was in critical condition today due to stroke code. I personally managed review of chart, medical record, meds, imaging, history, exam, and discussion with attending regarding plan and documentation. I spent a total of 60 minutes providing critical care services, evaluating the patient, directing care and reviewing laboratory values and radiologic reports.

## 2023-11-28 ENCOUNTER — APPOINTMENT (OUTPATIENT)
Dept: NEUROLOGY | Facility: HOSPITAL | Age: 38
DRG: 871 | End: 2023-11-28
Attending: INTERNAL MEDICINE

## 2023-11-28 LAB
ALBUMIN SERPL BCG-MCNC: 2.9 G/DL (ref 3.5–5.2)
ALP SERPL-CCNC: 82 U/L (ref 40–150)
ALT SERPL W P-5'-P-CCNC: 9 U/L (ref 0–50)
ANION GAP SERPL CALCULATED.3IONS-SCNC: 7 MMOL/L (ref 7–15)
AST SERPL W P-5'-P-CCNC: 15 U/L (ref 0–45)
BACTERIA BLD CULT: ABNORMAL
BILIRUB DIRECT SERPL-MCNC: <0.2 MG/DL (ref 0–0.3)
BILIRUB SERPL-MCNC: 0.2 MG/DL
BUN SERPL-MCNC: 8.6 MG/DL (ref 6–20)
CALCIUM SERPL-MCNC: 7.6 MG/DL (ref 8.6–10)
CHLORIDE SERPL-SCNC: 105 MMOL/L (ref 98–107)
CREAT SERPL-MCNC: 0.61 MG/DL (ref 0.51–0.95)
CRP SERPL-MCNC: 100.2 MG/L
D DIMER PPP FEU-MCNC: 0.75 UG/ML FEU (ref 0–0.5)
DEPRECATED HCO3 PLAS-SCNC: 23 MMOL/L (ref 22–29)
EGFRCR SERPLBLD CKD-EPI 2021: >90 ML/MIN/1.73M2
ERYTHROCYTE [DISTWIDTH] IN BLOOD BY AUTOMATED COUNT: 15.7 % (ref 10–15)
GLUCOSE BLDC GLUCOMTR-MCNC: 111 MG/DL (ref 70–99)
GLUCOSE BLDC GLUCOMTR-MCNC: 121 MG/DL (ref 70–99)
GLUCOSE BLDC GLUCOMTR-MCNC: 137 MG/DL (ref 70–99)
GLUCOSE BLDC GLUCOMTR-MCNC: 149 MG/DL (ref 70–99)
GLUCOSE BLDC GLUCOMTR-MCNC: 149 MG/DL (ref 70–99)
GLUCOSE BLDC GLUCOMTR-MCNC: 189 MG/DL (ref 70–99)
GLUCOSE SERPL-MCNC: 100 MG/DL (ref 70–99)
HCT VFR BLD AUTO: 29.1 % (ref 35–47)
HGB BLD-MCNC: 9 G/DL (ref 11.7–15.7)
HOLD SPECIMEN: NORMAL
LACTATE SERPL-SCNC: 0.8 MMOL/L (ref 0.7–2)
MAGNESIUM SERPL-MCNC: 2.1 MG/DL (ref 1.7–2.3)
MCH RBC QN AUTO: 23.1 PG (ref 26.5–33)
MCHC RBC AUTO-ENTMCNC: 30.9 G/DL (ref 31.5–36.5)
MCV RBC AUTO: 75 FL (ref 78–100)
PLATELET # BLD AUTO: 246 10E3/UL (ref 150–450)
POTASSIUM SERPL-SCNC: 3.4 MMOL/L (ref 3.4–5.3)
PROT SERPL-MCNC: 5.8 G/DL (ref 6.4–8.3)
RBC # BLD AUTO: 3.89 10E6/UL (ref 3.8–5.2)
SODIUM SERPL-SCNC: 135 MMOL/L (ref 135–145)
WBC # BLD AUTO: 6.7 10E3/UL (ref 4–11)

## 2023-11-28 PROCEDURE — 36415 COLL VENOUS BLD VENIPUNCTURE: CPT | Performed by: INTERNAL MEDICINE

## 2023-11-28 PROCEDURE — 250N000013 HC RX MED GY IP 250 OP 250 PS 637: Performed by: STUDENT IN AN ORGANIZED HEALTH CARE EDUCATION/TRAINING PROGRAM

## 2023-11-28 PROCEDURE — 250N000013 HC RX MED GY IP 250 OP 250 PS 637: Performed by: INTERNAL MEDICINE

## 2023-11-28 PROCEDURE — 82248 BILIRUBIN DIRECT: CPT | Performed by: INTERNAL MEDICINE

## 2023-11-28 PROCEDURE — 258N000003 HC RX IP 258 OP 636: Performed by: INTERNAL MEDICINE

## 2023-11-28 PROCEDURE — 120N000001 HC R&B MED SURG/OB

## 2023-11-28 PROCEDURE — 99233 SBSQ HOSP IP/OBS HIGH 50: CPT | Performed by: INTERNAL MEDICINE

## 2023-11-28 PROCEDURE — 95816 EEG AWAKE AND DROWSY: CPT

## 2023-11-28 PROCEDURE — 86140 C-REACTIVE PROTEIN: CPT | Performed by: INTERNAL MEDICINE

## 2023-11-28 PROCEDURE — 99222 1ST HOSP IP/OBS MODERATE 55: CPT | Performed by: PSYCHIATRY & NEUROLOGY

## 2023-11-28 PROCEDURE — 80048 BASIC METABOLIC PNL TOTAL CA: CPT | Performed by: INTERNAL MEDICINE

## 2023-11-28 PROCEDURE — 250N000011 HC RX IP 250 OP 636: Mod: JZ | Performed by: INTERNAL MEDICINE

## 2023-11-28 PROCEDURE — 250N000013 HC RX MED GY IP 250 OP 250 PS 637

## 2023-11-28 PROCEDURE — 95819 EEG AWAKE AND ASLEEP: CPT

## 2023-11-28 PROCEDURE — 83735 ASSAY OF MAGNESIUM: CPT | Performed by: INTERNAL MEDICINE

## 2023-11-28 PROCEDURE — 85027 COMPLETE CBC AUTOMATED: CPT | Performed by: INTERNAL MEDICINE

## 2023-11-28 PROCEDURE — 99232 SBSQ HOSP IP/OBS MODERATE 35: CPT | Performed by: INTERNAL MEDICINE

## 2023-11-28 PROCEDURE — 95816 EEG AWAKE AND DROWSY: CPT | Mod: 26 | Performed by: PSYCHIATRY & NEUROLOGY

## 2023-11-28 PROCEDURE — 85379 FIBRIN DEGRADATION QUANT: CPT | Performed by: INTERNAL MEDICINE

## 2023-11-28 PROCEDURE — 250N000011 HC RX IP 250 OP 636: Mod: JZ | Performed by: STUDENT IN AN ORGANIZED HEALTH CARE EDUCATION/TRAINING PROGRAM

## 2023-11-28 PROCEDURE — 83605 ASSAY OF LACTIC ACID: CPT | Performed by: INTERNAL MEDICINE

## 2023-11-28 RX ORDER — NALOXONE HYDROCHLORIDE 0.4 MG/ML
0.4 INJECTION, SOLUTION INTRAMUSCULAR; INTRAVENOUS; SUBCUTANEOUS
Status: DISCONTINUED | OUTPATIENT
Start: 2023-11-28 | End: 2023-11-30 | Stop reason: HOSPADM

## 2023-11-28 RX ORDER — OXYCODONE HYDROCHLORIDE 5 MG/1
5 TABLET ORAL EVERY 4 HOURS PRN
Status: DISCONTINUED | OUTPATIENT
Start: 2023-11-28 | End: 2023-11-30 | Stop reason: HOSPADM

## 2023-11-28 RX ORDER — SENNOSIDES 8.6 MG
8.6 TABLET ORAL 2 TIMES DAILY PRN
Status: DISCONTINUED | OUTPATIENT
Start: 2023-11-28 | End: 2023-11-28

## 2023-11-28 RX ORDER — NALOXONE HYDROCHLORIDE 0.4 MG/ML
0.2 INJECTION, SOLUTION INTRAMUSCULAR; INTRAVENOUS; SUBCUTANEOUS
Status: DISCONTINUED | OUTPATIENT
Start: 2023-11-28 | End: 2023-11-30 | Stop reason: HOSPADM

## 2023-11-28 RX ORDER — POLYETHYLENE GLYCOL 3350 17 G/17G
17 POWDER, FOR SOLUTION ORAL DAILY
Status: DISCONTINUED | OUTPATIENT
Start: 2023-11-28 | End: 2023-11-30 | Stop reason: HOSPADM

## 2023-11-28 RX ADMIN — ACETAMINOPHEN 975 MG: 325 TABLET ORAL at 17:34

## 2023-11-28 RX ADMIN — ONDANSETRON 4 MG: 2 INJECTION INTRAMUSCULAR; INTRAVENOUS at 17:33

## 2023-11-28 RX ADMIN — ACETAMINOPHEN 975 MG: 325 TABLET ORAL at 22:53

## 2023-11-28 RX ADMIN — GABAPENTIN 300 MG: 300 CAPSULE ORAL at 15:30

## 2023-11-28 RX ADMIN — CEFTRIAXONE SODIUM 2 G: 2 INJECTION, POWDER, FOR SOLUTION INTRAMUSCULAR; INTRAVENOUS at 17:36

## 2023-11-28 RX ADMIN — SODIUM CHLORIDE 50 ML: 9 INJECTION, SOLUTION INTRAVENOUS at 08:27

## 2023-11-28 RX ADMIN — IBUPROFEN 600 MG: 600 TABLET, FILM COATED ORAL at 08:19

## 2023-11-28 RX ADMIN — GABAPENTIN 300 MG: 300 CAPSULE ORAL at 08:18

## 2023-11-28 RX ADMIN — SODIUM CHLORIDE: 9 INJECTION, SOLUTION INTRAVENOUS at 17:36

## 2023-11-28 RX ADMIN — REMDESIVIR 100 MG: 100 INJECTION, POWDER, LYOPHILIZED, FOR SOLUTION INTRAVENOUS at 08:24

## 2023-11-28 RX ADMIN — ACETAMINOPHEN 975 MG: 325 TABLET ORAL at 00:24

## 2023-11-28 RX ADMIN — POLYETHYLENE GLYCOL 3350 17 G: 17 POWDER, FOR SOLUTION ORAL at 22:55

## 2023-11-28 ASSESSMENT — ACTIVITIES OF DAILY LIVING (ADL)
ADLS_ACUITY_SCORE: 20
ADLS_ACUITY_SCORE: 20
ADLS_ACUITY_SCORE: 18
ADLS_ACUITY_SCORE: 18
ADLS_ACUITY_SCORE: 20
ADLS_ACUITY_SCORE: 18
ADLS_ACUITY_SCORE: 20
ADLS_ACUITY_SCORE: 18

## 2023-11-28 NOTE — PLAN OF CARE
Problem: Adult Inpatient Plan of Care  Goal: Absence of Hospital-Acquired Illness or Injury  Intervention: Identify and Manage Fall Risk  Recent Flowsheet Documentation  Taken 11/28/2023 1103 by Adrienne Menjivar RN  Safety Promotion/Fall Prevention:   nonskid shoes/slippers when out of bed   safety round/check completed   patient and family education   clutter free environment maintained  Taken 11/28/2023 0810 by Adrienne Menjivar RN  Safety Promotion/Fall Prevention:   nonskid shoes/slippers when out of bed   safety round/check completed   patient and family education   clutter free environment maintained   Goal Outcome Evaluation:      Plan of Care Reviewed With: patient    Overall Patient Progress: improvingOverall Patient Progress: improving  Problem: Pain Acute  Goal: Optimal Pain Control and Function  Intervention: Prevent or Manage Pain  Recent Flowsheet Documentation  Taken 11/28/2023 1103 by Adrienne Menjivar RN  Medication Review/Management: medications reviewed  Taken 11/28/2023 0810 by Adrienne Menjivar RN  Medication Review/Management: medications reviewed            Patient stand by assist with IV pole.  Remains in COVID precautions.  Intermittent nonproductive cough.  States pain in right side just below ribs.  Denies any difficultly breathing or chest pain.  Patient able to remove weave and EEG completed.   and 149 today.  Patient has fear of needles, and does not feel ready to do her own insulin injections.  Home needles in room for when patient is ready.  Neuro assessment changed to every 4 hours.  No NIHSS ordered.  Patient remains alert and orientated, able to move all extremities.  Denies any numbness or tingling.

## 2023-11-28 NOTE — CONSULTS
[facility] Neurology Consultation    Sandee Arroyo MRN# [id number]   Age: 38 year old YOB: 1985     Requesting physician: No ref. provider found  No Ref-Primary, Physician     Reason for Consultation: lethargy     Assessment and Plan:   Assessment:  Acute lethargy in setting of ongoing infections    The patient's history is reassuring for a lack of seizure or seizure like events, and lack of focal deficits from her prior CVA (left parietal in 2007).  Her exam today is limited due to her fatigue and ongoing illness. With her imaging being reassuring for lack of hemorrhage, infarction, or vessel related stenosis/occlusion, it seems unlikely there is some new regional brain injury present. Given the patient's acute change in mental status, and old regional brain injury, I do think an EEG would be helpful to make sure her current illnesses are not reducing a seizure threshold and leading to some sort of subclinical events.    Plan:  - EEG routine today  - I wouldn't necessarily consider CSF testing or repeat imaging unless new symptoms emerge     CHELO Paredes D.O.  [facility] Neurology  Pager: [phone]    Total time today (67 min) in this patient encounter was spent on pre-charting, counseling and/or coordination of care.         History of Presenting Symptoms:   Sandee Arroyo is a 38 year old female who presents today for evaluation of altered mental status. She has a pertinent medical history of a reported hemorrhagic stroke during pregnancy (2007) and pre-ecclampsia period leaving her with left parietal encephalomalacia, DMII, and asthma.  She presented to the ED at [facility] 11/25/2023 with days of headaches/fever/fatigue and urinary issues.  Testing revealed UTI, as well as COVID +.  In the ED she was febrile, and there was concern for urosepsis given fevers of 103 and bacertemia (11/25/cultures).  She was admitted for treatment and monitoring.      Significant events are  "reported 11/27/2023 at 4PM, in that the patient had a Rapid called due to acute onset lethargy over a period of one hour (opening eyes but closing instantly, lifts arm but slow and weak).  Stroke code was made, but after initial evaluation with telestroke it was cancelled.  I spoke with the patient's hospitalist the night of 11/27/2023 and indicated it would be helpful to obtain EEG, but this was delayed due to hair extension issues.    Today, the patient doesn't recall much of her confusion/lethargy event yesterday, but does tell me this is not a common occurrence.  She did have a stroke in the past as stated above, but only has remaining issues of headaches which are infrequent. She never has had seizures of her past, family history of seizure, regional brain trauma other than 2007 event, and hasn't noted any stereotyped movements during this admission or prior. She does feel back to her baseline, but is tired and fatigued.     Medications:   Remdesivir  Albuterol  Gabapentin 300 every day  Melatonin  Compazine     Physical Exam:   Vitals: BP 97/58 (BP Location: Left arm, Patient Position: Semi-Davis's, Cuff Size: Adult Regular)   Pulse 113   Temp 98.2  F (36.8  C) (Oral)   Resp 20   Ht 1.6 m (5' 3\")   Wt 81.6 kg (180 lb)   SpO2 99%   BMI 31.89 kg/m     General: Seated in chair. Seems fatigues and somewhat uncomfortable. Tired today.   Neurologic:     Mental Status: Fully alert, attentive and oriented. Speech clear and fluent, no paraphasic errors.      Cranial Nerves: declined     Motor: Declined     Deep Tendon Reflexes: Declined     Sensory: declined      Coordination: declined     Gait: declined         Data: Pertinent prior to visit   Imaging:  CTA head and neck w/contrast: 12/27/2023  - chronic encephalomalacia of left parietal lobe, no other regional encephalomalacia noted. No hemorrhage, or other infarction of note upon review. No signs of meningioma or other tumor. Vessels look open intracranially " and in the neck (no occlusion, stenosis, aneurysm).

## 2023-11-28 NOTE — PROGRESS NOTES
[facility]    Medicine Progress Note - Hospitalist Service    Date of Admission:  11/25/2023    Assessment & Plan   39 y/o female with asthma, prior hx of cva, dm uncontrolled new diagnosis, here with Covid, UTI, and concern sepsis    1.Fevers, up to 103, sepsis--bacteremia now--pos cx 11/25/23  -concern for sepsis from Urosepsis  -has abd pain, L sided pyelonephritis with E.coli bacteremia 11/25  -iv Zosyn--ID switched to Ceftriaxone iv  -blood cx--11/26 those are NTD  -tele  -CT abd /pelvis--showed pyelo on left--no stone    2.Urosepsis--E.coli  -iv ceftriaxone  -UC  -blood cx--pos and Uc match    3.Covid infection  -hx of vaccine x 1  back in 2021  -with risk asthma, uncontrolled DM, high risk for complications AND on 2 liters O2  -started dexamethasone on admit when on O2--now off O2 holding (only had 1 day dex)  -Remdesivir continue since high risk pt  -pulse ox--on RA still   -precautions    4.DM-new Dx for her   -she told me she had no idea about this  -FH pos DM in her mom  -A1c 13 no admit  -needs insulin and now complex with infections and need dex  -started lantus--15 units at bedtime, with lower bs will decrease tonight to 10 units  -sliding scale, holding carb counting today, allowing higher BS, suspect yesterday events partly from her starting to get used to lower BS  -DM ed working with her on fear of giving shots  -Nutrition to see, place on DM diet    5.Episode of less responsiveness on 11/26--stroke code  -had stroke code , was less responsive early afternoon  -CTA neg for new cva  -neuro exam improved  -neuro to see and eeg ordered  -Suspect episode related to her getting used to lower bs, sepsis, and covid    6.Pseudohyponatremia  -from hyperglycemia    7.hx of asthma  -albuterol prn    8.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--preeclampsia severe--had emergency c/s and she thinks it was a hemorrhagic cva  -on ashley only     9.Low mag  -iv mag was given on admit    10.Headache-  "Ibuprofen prn  -improved today     11.Social- SW working on her lack of insurance, sister is very supportive and she herself is a DM and very familiar with DM and will be huge support for her    12.DVT prevent-lovenox      I called her sister at 1228 to update --I did reach her             Diet: Moderate Consistent Carb (60 g CHO per Meal) Diet    DVT Prophylaxis: Enoxaparin (Lovenox) SQ  Ardon Catheter: Not present  Lines: None     Cardiac Monitoring: ACTIVE order. Indication: sepsis  Code Status: Full Code      Clinically Significant Risk Factors              # Hypoalbuminemia: Lowest albumin = 2.9 g/dL at 11/28/2023  6:31 AM, will monitor as appropriate  # Coagulation Defect: INR = 1.27 (Ref range: 0.85 - 1.15) and/or PTT = N/A, will monitor for bleeding          # DMII: A1C = 13.5 % (Ref range: <5.7 %) within past 6 months, PRESENT ON ADMISSION  # Obesity: Estimated body mass index is 31.89 kg/m  as calculated from the following:    Height as of this encounter: 1.6 m (5' 3\").    Weight as of this encounter: 81.6 kg (180 lb)., PRESENT ON ADMISSION            Disposition Plan      Expected Discharge Date: 11/30/2023    Discharge Delays: IV Medication - consider oral or Home Infusion  Financial counseling needed  Specialist Consult (enter specialist & decision needed in comments)    Discharge Comments: COVID+            Lilibeth Reis MD  Hospitalist Service  [facility]  Securely message with [employer] (more info)  Text page via [employer] Paging/Directory   ______________________________________________________________________    Interval History   Cough is better, still there  Still off O2  No headache this am  Not much appetite  Some side pain, better        Physical Exam   Vital Signs: Temp: 98  F (36.7  C) Temp src: Oral BP: 107/63 Pulse: 90   Resp: 18 SpO2: 100 % O2 Device: None (Room air)    Weight: 180 lbs 0 oz    Constitutional: awake, fatigued, alert, cooperative, and no apparent " distress  Respiratory: No increased work of breathing, good air exchange, clear to auscultation bilaterally, no crackles or wheezing  Cardiovascular: Normal apical impulse, regular rate and rhythm, normal S1 and S2, no S3 or S4, and no murmur noted  GI: normal bowel sounds, soft, non-distended, and tenderness noted right upper quad  Skin: no bruising or bleeding  Musculoskeletal: no lower extremity pitting edema present  Neurologic: Mental Status Exam:  Level of Alertness:   awake, speech fluent, motor equal  Neuropsychiatric: General: normal, calm, and normal eye contact    Medical Decision Making       55 MINUTES SPENT BY ME on the date of service doing chart review, history, exam, documentation & further activities per the note.      Data     I have personally reviewed the following data over the past 24 hrs:    6.7  \   9.0 (L)   / 246     135 105 8.6 /  111 (H)   3.4 23 0.61 \     ALT: 9 AST: 15 AP: 82 TBILI: 0.2   ALB: 2.9 (L) TOT PROTEIN: 5.8 (L) LIPASE: N/A     Procal: N/A CRP: 100.20 (H) Lactic Acid: 0.8       INR:  N/A PTT:  N/A   D-dimer:  0.75 (H) Fibrinogen:  N/A       Imaging results reviewed over the past 24 hrs:   Recent Results (from the past 24 hour(s))   CTA Head Neck with Contrast    Narrative    HEAD AND NECK CT ANGIOGRAM WITH IV CONTRAST  11/27/2023 3:26 PM CST    INDICATION: Stroke symptoms, confusion and aphasia, slurred speech, lethargy.  TECHNIQUE: Head and neck CT angiogram with IV contrast. Noncontrast head CT followed by axial helical CT images of the head and neck vessels obtained during the arterial phase of intravenous contrast administration. Axial helical 2D reconstructed images   and multiplanar 3D MIP reconstructed images of the head and neck vessels were performed by the technologist. Dose reduction techniques were used.  CONTRAST: 75ml isovue 370.  COMPARISON: None.     FINDINGS:  NONCONTRAST HEAD CT: No intracranial hemorrhage, extraaxial collection, mass effect or CT evidence  of acute infarct.  Moderate area of chronic encephalomalacia in the parasagittal left parietal lobe. Coarse nodular and linear calcification in the left   frontal lobe. The ventricles and sulci are normal for age. Osseous structures are intact. The visualized paranasal sinuses are free of significant disease. The mastoid air cells are free of significant disease. The orbits are unremarkable.    HEAD CTA: The intracranial circulation is patent without evidence for aneurysm, proximal vessel occlusion, high-grade intracranial stenosis or arteriovenous malformation. Patent dural venous sinuses.    NECK CTA: Three vessel arch.  Carotid arteries are patent without atherosclerotic change.  No hemodynamically significant stenosis by NASCET criteria in either carotid system.  Patent vertebral arteries. No dissection.      Impression    CONCLUSION:  HEAD CT:  1.  No intracranial hemorrhage, mass, or definite CT evidence of recent ischemia.  2.  Moderate area of chronic encephalomalacia in the parasagittal left parietal lobe.  3.  Left frontal lobe calcification appears chronic. It could reflect old injury or an underlying abnormality such as a cavernoma. Comparison with old imaging would be most useful.    HEAD CTA:  1.  Negative. No vessel stenosis, occlusion or aneurysm.    NECK CTA:  1.  Negative. No dissection or hemodynamically significant narrowing in the neck by NASCET criteria.    Findings were discussed with PAOLO TORRE via telephone at 1534 hours on 11/27/2023.

## 2023-11-28 NOTE — PROGRESS NOTES
EEG Tech unable to access the scalp due to hair extensions. RN will call EEG Tech once the issue is resolved.

## 2023-11-28 NOTE — PLAN OF CARE
Pt is back to baseline post rapid response at the end of previous shift. Pt is alert oriented x4  VSS except c/o intermittent headaches. Tylenol given earlier with some relief. Pt has been indep to bathroom and voiding without difficulty. Appetite poor but taking fluids fair. Pt triggered the sepsis protocol at HS but decline lab draw. Currently in bed resting. No other concerns reported by pt. Plan of care on going.    Problem: Pain Acute  Goal: Optimal Pain Control and Function  Outcome: Progressing  Intervention: Optimize Psychosocial Wellbeing  Recent Flowsheet Documentation  Taken 11/27/2023 1801 by Sandra Wang, RN  Supportive Measures:   active listening utilized   positive reinforcement provided  Taken 11/27/2023 1600 by Sandra Wang, RN  Supportive Measures:   active listening utilized   positive reinforcement provided     Problem: Comorbidity Management  Goal: Blood Glucose Levels Within Targeted Range  Outcome: Progressing   Goal Outcome Evaluation:

## 2023-11-28 NOTE — PLAN OF CARE
Problem: Adult Inpatient Plan of Care  Goal: Plan of Care Review  Description: The Plan of Care Review/Shift note should be completed every shift.  The Outcome Evaluation is a brief statement about your assessment that the patient is improving, declining, or no change.  This information will be displayed automatically on your shift  note.  Outcome: Progressing   Goal Outcome Evaluation:       Alert and oriented x 4. Febrile last night with temp of 102.4 PRN tylenol given with good effect. Sepsis protocol was triggered, blood drawn for lactic with 0.8 result. BG at 0200 was 149 and at 0400 was 121. Independent to the bathroom. No sob or trouble breathing noted. Lungs sound clear, respiration are even and non labored. NSR on tele. No significant changes noted this shift. Slept between cares.

## 2023-11-28 NOTE — PROGRESS NOTES
Bedside EEG was performed. Wake, drowsy  were obtained.   Activations completed were: (eyes open/eyes closed, mental activations, photic)   Activations omitted & reasoning: Hyperventilations due to patient sate  Patient's mental status was: (alert/oriented)  Was the neurologist consulted regarding significant waveforms or interventions needed? n/a  Skin intact? Yes     EEG #   EEG system used: [id number]    Neurologist dictation to follow.

## 2023-11-28 NOTE — PROGRESS NOTES
Infectious Diseases Progress Note  [facility]    Date of visit: 11/28/2023     ASSESSMENT   38-year-old woman with a history of asthma and previous CVA who is admitted with fevers and UTI symptoms.    Left-sided pyelonephritis.  Presenting with fevers and dysuria.  CT scan showing left-sided pyelonephritis.  Urine culture positive for E. coli  E. coli bacteremia.  2 of 2 blood cultures on admission growing E. coli.  Likely secondary to urinary tract infection.  Elevated procalcitonin.  Diabetes, new diagnosis.  Hemoglobin A1c 13.5  COVID infection.  Positive COVID screen on admission.  Some shortness of breath on admission.  Initially needed oxygen, but is currently on room air.  CT scan of the chest without infiltrates.  Started on remdesivir.  History of CVA.  Chronic appearing left frontal lobe lesion.  Stroke code called today, no signs of active stroke per neurology evaluation.  New headache this afternoon.    Principal Problem:    Urinary tract infection without hematuria, site unspecified  Active Problems:    COVID-19 virus infection       PLAN   -Continue ceftriaxone  -Anticipate 2-week course of antibiotics, likely switch to p.o. when ready for discharge - not yet   -would stop remdesivir      Salvatore Reynolds MD  [facility]  Direct messaging: [employer] Paging  On-Call ID provider: [phone], option: 9      ===========================================        SUBJECTIVE / INTERVAL HISTORY:     Fever overnight, otherwise feels well. Denies shortness of breath. Some cough.       Antibiotics   Ceftriaxone 11/25, 11/27-  Previous:    Zosyn 11/26-11/27  Remdesivir 11/26-11/28    Physical Exam     Temp:  [98  F (36.7  C)-102.4  F (39.1  C)] 98.5  F (36.9  C)  Pulse:  [] 90  Resp:  [17-20] 17  BP: ()/(57-83) 118/72  SpO2:  [95 %-100 %] 100 %    /72 (BP Location: Left arm, Patient Position: Sitting, Cuff Size: Adult Regular)   Pulse 90   Temp 98.5  F (36.9  C)  "(Oral)   Resp 17   Ht 1.6 m (5' 3\")   Wt 81.6 kg (180 lb)   SpO2 100%   BMI 31.89 kg/m      GENERAL:  well-developed, well-nourished, sitting in chair in no acute distress.   HENT:  Head is normocephalic, atraumatic.   EYES:  Eyes have anicteric sclerae without conjunctival injection   LUNGS:  Clear to auscultation.  CARDIOVASCULAR:  Regular rate and rhythm with no murmurs, gallops or rubs.  ABDOMEN:  Normal bowel sounds, soft, nontender. No cva tenderness  EXT: Extremities warm and without edema.  SKIN:  No acute rashes.   NEUROLOGIC:  Grossly nonfocal.      Cultures   11/25 urine culture: Greater than 100,000 colonies E. coli  11/25 blood cultures x 2: E. coli  11/26 blood cultures x 2: No growth to date    Susceptibility data from last 90 days.  Collected Specimen Info Organism Ampicillin Ampicillin/Sulbactam Cefazolin Cefepime Cefoxitin Ceftazidime Ceftriaxone Ciprofloxacin Gentamicin Levofloxacin Meropenem Nitrofurantoin Piperacillin/Tazobactam Tobramycin   11/25/23 Peripheral Blood Escherichia coli                 11/25/23 Peripheral Blood Escherichia coli  S  S   S   S  S  S  S  S  S   S  S   11/25/23 Urine, Midstream Escherichia coli  S  S  S  S  S  S  S  S  S  S   S  S  S     Collected Specimen Info Organism Trimethoprim/Sulfamethoxazole    11/25/23 Peripheral Blood Escherichia coli    11/25/23 Peripheral Blood Escherichia coli  S   11/25/23 Urine, Midstream Escherichia coli  S             Pertinent Labs:     Recent Labs   Lab 11/28/23  0631 11/27/23  0700 11/26/23  0704   WBC 6.7 10.1 10.4   HGB 9.0* 9.7* 8.8*    251 226       Recent Labs   Lab 11/28/23  0631 11/27/23  0700 11/26/23  0704    132* 132*  132*   CO2 23 23 20*  20*   BUN 8.6 13.0 12.9  12.9   ALBUMIN 2.9* 3.3* 3.6   ALKPHOS 82 108 108   ALT 9 11 9   AST 15 15 16       Recent Labs   Lab 11/28/23  0631 11/27/23  0700 11/26/23  0704   CRPI 100.20* 213.60* 277.90*           Imaging:     CTA Head Neck with Contrast    Result " Date: 11/27/2023  HEAD AND NECK CT ANGIOGRAM WITH IV CONTRAST 11/27/2023 3:26 PM CST INDICATION: Stroke symptoms, confusion and aphasia, slurred speech, lethargy. TECHNIQUE: Head and neck CT angiogram with IV contrast. Noncontrast head CT followed by axial helical CT images of the head and neck vessels obtained during the arterial phase of intravenous contrast administration. Axial helical 2D reconstructed images and multiplanar 3D MIP reconstructed images of the head and neck vessels were performed by the technologist. Dose reduction techniques were used. CONTRAST: 75ml isovue 370. COMPARISON: None. FINDINGS: NONCONTRAST HEAD CT: No intracranial hemorrhage, extraaxial collection, mass effect or CT evidence of acute infarct.  Moderate area of chronic encephalomalacia in the parasagittal left parietal lobe. Coarse nodular and linear calcification in the left frontal lobe. The ventricles and sulci are normal for age. Osseous structures are intact. The visualized paranasal sinuses are free of significant disease. The mastoid air cells are free of significant disease. The orbits are unremarkable. HEAD CTA: The intracranial circulation is patent without evidence for aneurysm, proximal vessel occlusion, high-grade intracranial stenosis or arteriovenous malformation. Patent dural venous sinuses. NECK CTA: Three vessel arch.  Carotid arteries are patent without atherosclerotic change.  No hemodynamically significant stenosis by NASCET criteria in either carotid system.  Patent vertebral arteries. No dissection.     CONCLUSION: HEAD CT: 1.  No intracranial hemorrhage, mass, or definite CT evidence of recent ischemia. 2.  Moderate area of chronic encephalomalacia in the parasagittal left parietal lobe. 3.  Left frontal lobe calcification appears chronic. It could reflect old injury or an underlying abnormality such as a cavernoma. Comparison with old imaging would be most useful. HEAD CTA: 1.  Negative. No vessel stenosis,  occlusion or aneurysm. NECK CTA: 1.  Negative. No dissection or hemodynamically significant narrowing in the neck by NASCET criteria. Findings were discussed with PAOLO TORRE via telephone at 1534 hours on 11/27/2023.    CT Abdomen Pelvis w Contrast    Result Date: 11/26/2023  EXAM: CT ABDOMEN PELVIS W CONTRAST LOCATION: [facility] DATE: 11/26/2023 INDICATION: 38 y o with urosepsis, check pyelo, also has COVID COMPARISON: None. TECHNIQUE: CT scan of the abdomen and pelvis was performed following injection of IV contrast. Multiplanar reformats were obtained. Dose reduction techniques were used. CONTRAST: isovue 370 90ml FINDINGS: LOWER CHEST: Normal. HEPATOBILIARY: Normal. PANCREAS: Normal. SPLEEN: Normal. ADRENAL GLANDS: Normal. KIDNEYS/BLADDER: Patchy enhancement of the left kidney with mild adjacent inflammatory stranding and fluid, correlate for pyelonephritis. No organized fluid collection. No hydroureteronephrosis. Ladder wall thickening consistent with cystitis. BOWEL: Large volume retained feces. No obstruction, colitis, or diverticulitis. Normal appendix. LYMPH NODES: Normal. VASCULATURE: Unremarkable. PELVIC ORGANS: Fibroid. MUSCULOSKELETAL: Normal.     IMPRESSION: 1.  Left pyelonephritis. No obstructing renal or ureteral stones. 2.  No obstruction, colitis, diverticulitis, or appendicitis. 3.  Calcified uterine fibroid.    CT Chest Pulmonary Embolism w Contrast    Result Date: 11/26/2023  EXAM: CT CHEST PULMONARY EMBOLISM W CONTRAST LOCATION: [facility] DATE: 11/26/2023 INDICATION: 38 y o wtih hx of asthma, COVID now, tachy, make sure no PE COMPARISON: None. TECHNIQUE: CT chest pulmonary angiogram during arterial phase injection of IV contrast. Multiplanar reformats and MIP reconstructions were performed. Dose reduction techniques were used. CONTRAST: isovue 370 90ml FINDINGS: ANGIOGRAM CHEST: Pulmonary arteries are normal caliber and negative for  pulmonary emboli. Thoracic aorta is negative for dissection. No CT evidence of right heart strain. LUNGS AND PLEURA: Discoid atelectasis. No pneumothorax or pleural effusion. MEDIASTINUM/AXILLAE: Right axilla within normal limits. CORONARY ARTERY CALCIFICATION: None. UPPER ABDOMEN: Please, see dedicated CT abdomen and pelvis performed same day. MUSCULOSKELETAL: Normal.     IMPRESSION: 1.  No pulmonary embolus, aortic dissection, or aneurysm. 2.  By lateral dependent atelectasis.        Data reviewed today: I reviewed all medications, new labs and imaging results over the last 24 hours. I personally reviewed no images or EKG's today.  The patient's care was discussed with the Patient.

## 2023-11-29 ENCOUNTER — APPOINTMENT (OUTPATIENT)
Dept: ULTRASOUND IMAGING | Facility: HOSPITAL | Age: 38
DRG: 871 | End: 2023-11-29
Attending: INTERNAL MEDICINE

## 2023-11-29 PROBLEM — D64.9 CHRONIC ANEMIA: Status: ACTIVE | Noted: 2023-11-29

## 2023-11-29 PROBLEM — J45.20 MILD INTERMITTENT ASTHMA WITHOUT COMPLICATION: Status: ACTIVE | Noted: 2023-11-29

## 2023-11-29 PROBLEM — E83.42 HYPOMAGNESEMIA: Status: ACTIVE | Noted: 2023-11-29

## 2023-11-29 PROBLEM — A41.51 SEPSIS DUE TO ESCHERICHIA COLI WITHOUT ACUTE ORGAN DYSFUNCTION (H): Status: ACTIVE | Noted: 2023-11-29

## 2023-11-29 PROBLEM — B96.20 BACTEREMIA DUE TO ESCHERICHIA COLI: Status: ACTIVE | Noted: 2023-11-29

## 2023-11-29 PROBLEM — Z86.73 HISTORY OF CVA (CEREBROVASCULAR ACCIDENT): Status: ACTIVE | Noted: 2023-11-29

## 2023-11-29 PROBLEM — N10 ACUTE PYELONEPHRITIS: Status: ACTIVE | Noted: 2023-11-29

## 2023-11-29 PROBLEM — N39.0 URINARY TRACT INFECTION WITHOUT HEMATURIA, SITE UNSPECIFIED: Status: RESOLVED | Noted: 2023-11-25 | Resolved: 2023-11-29

## 2023-11-29 PROBLEM — R78.81 BACTEREMIA DUE TO ESCHERICHIA COLI: Status: ACTIVE | Noted: 2023-11-29

## 2023-11-29 PROBLEM — E11.65 TYPE 2 DIABETES MELLITUS WITH HYPERGLYCEMIA, WITHOUT LONG-TERM CURRENT USE OF INSULIN (H): Status: ACTIVE | Noted: 2023-11-29

## 2023-11-29 LAB
D DIMER PPP FEU-MCNC: 1.59 UG/ML FEU (ref 0–0.5)
GLUCOSE BLDC GLUCOMTR-MCNC: 130 MG/DL (ref 70–99)
GLUCOSE BLDC GLUCOMTR-MCNC: 134 MG/DL (ref 70–99)
GLUCOSE BLDC GLUCOMTR-MCNC: 135 MG/DL (ref 70–99)
GLUCOSE BLDC GLUCOMTR-MCNC: 159 MG/DL (ref 70–99)
GLUCOSE BLDC GLUCOMTR-MCNC: 188 MG/DL (ref 70–99)
LACTATE SERPL-SCNC: 0.9 MMOL/L (ref 0.7–2)

## 2023-11-29 PROCEDURE — 99207 PR NO CHARGE LOS: CPT | Performed by: PSYCHIATRY & NEUROLOGY

## 2023-11-29 PROCEDURE — 36415 COLL VENOUS BLD VENIPUNCTURE: CPT | Performed by: INTERNAL MEDICINE

## 2023-11-29 PROCEDURE — 99232 SBSQ HOSP IP/OBS MODERATE 35: CPT | Performed by: INTERNAL MEDICINE

## 2023-11-29 PROCEDURE — 250N000011 HC RX IP 250 OP 636: Mod: JZ | Performed by: INTERNAL MEDICINE

## 2023-11-29 PROCEDURE — 250N000011 HC RX IP 250 OP 636: Mod: JZ | Performed by: STUDENT IN AN ORGANIZED HEALTH CARE EDUCATION/TRAINING PROGRAM

## 2023-11-29 PROCEDURE — 83605 ASSAY OF LACTIC ACID: CPT | Performed by: INTERNAL MEDICINE

## 2023-11-29 PROCEDURE — 93970 EXTREMITY STUDY: CPT

## 2023-11-29 PROCEDURE — 120N000001 HC R&B MED SURG/OB

## 2023-11-29 PROCEDURE — 85379 FIBRIN DEGRADATION QUANT: CPT | Performed by: INTERNAL MEDICINE

## 2023-11-29 PROCEDURE — 250N000013 HC RX MED GY IP 250 OP 250 PS 637: Performed by: INTERNAL MEDICINE

## 2023-11-29 PROCEDURE — 250N000013 HC RX MED GY IP 250 OP 250 PS 637: Performed by: STUDENT IN AN ORGANIZED HEALTH CARE EDUCATION/TRAINING PROGRAM

## 2023-11-29 PROCEDURE — 250N000013 HC RX MED GY IP 250 OP 250 PS 637

## 2023-11-29 RX ORDER — MULTIPLE VITAMINS W/ MINERALS TAB 9MG-400MCG
1 TAB ORAL DAILY
Status: DISCONTINUED | OUTPATIENT
Start: 2023-11-29 | End: 2023-11-30 | Stop reason: HOSPADM

## 2023-11-29 RX ADMIN — PROCHLORPERAZINE EDISYLATE 10 MG: 5 INJECTION INTRAMUSCULAR; INTRAVENOUS at 10:10

## 2023-11-29 RX ADMIN — ACETAMINOPHEN 975 MG: 325 TABLET ORAL at 10:15

## 2023-11-29 RX ADMIN — ONDANSETRON 4 MG: 2 INJECTION INTRAMUSCULAR; INTRAVENOUS at 07:19

## 2023-11-29 RX ADMIN — GABAPENTIN 300 MG: 300 CAPSULE ORAL at 10:21

## 2023-11-29 RX ADMIN — MULTIPLE VITAMINS W/ MINERALS TAB 1 TABLET: TAB at 17:04

## 2023-11-29 RX ADMIN — GABAPENTIN 300 MG: 300 CAPSULE ORAL at 21:53

## 2023-11-29 RX ADMIN — METFORMIN HYDROCHLORIDE 500 MG: 500 TABLET, FILM COATED ORAL at 17:04

## 2023-11-29 RX ADMIN — GABAPENTIN 300 MG: 300 CAPSULE ORAL at 14:04

## 2023-11-29 RX ADMIN — Medication 5 MG: at 22:00

## 2023-11-29 RX ADMIN — OXYCODONE HYDROCHLORIDE 5 MG: 5 TABLET ORAL at 01:26

## 2023-11-29 RX ADMIN — CEFTRIAXONE SODIUM 2 G: 2 INJECTION, POWDER, FOR SOLUTION INTRAMUSCULAR; INTRAVENOUS at 17:04

## 2023-11-29 ASSESSMENT — ACTIVITIES OF DAILY LIVING (ADL)
ADLS_ACUITY_SCORE: 18

## 2023-11-29 NOTE — PROGRESS NOTES
[facility]    Medicine Progress Note - Hospitalist Service    Date of Admission:  11/25/2023    Assessment & Plan     Sandee Arroyo is a 37 y/o female with history of asthma and CVA. Admitted on 11/25/23 with UTI and sepsis. COVID positive.     Sepsis, UTI, bacteremia:  Presented with fever with temperature up to 103, abdominal pain.  Had significantly elevated procalcitonin to 5.53.   CT abdomen showed Left pyelonephritis. No obstructing renal or ureteral stones.   Urine culture 11/25: pansensitive E coli  Blood culture: 2 bottles both positive for E coli.   - Received Zosyn. Now switched to Ceftriaxone based on culture sensitivity.  - Blood culture repeated 11/26. No growth so far. Follow up results.   - ID consulted and input appreciated.    COVID19 infection:   Positive during admission screen.   Elevated d dimer to 1.35.   CT chest showed no focal infiltrate and no pulmonary embolism.   Vaccinated x 1 in 2021  Was suspected to have hypoxia and received one day of dexamethasone. Now off supplemental oxygen.   Received Remdesivir for 3 days due to high risks of progression.   - Monitor  - Lovenox for DVT prophylaxis.   - Continue isolation  - Had low grade fever today and d dimer trended up. Will US to rule out DVT.    Type II diabetes with hyperglycemia: this is a new diagnosis. HbA1C 13.5   - Blood sugar reviewed today and it is controlled. Continue Lantus 10 units at bedtime. Novolog sliding scale tidac.  - Sister reports that she also has diabetes and states that she is on weekly trulicity and metformin. Patient is interested in this regimen. For discharge, Lantus plus oral medication are preferred. Start metformin. Can follow up outpatient to eventually switch to trulicity.   - Diabetes educator consulted and input appreciated.     Episodes of altered mental status:   Had episode of less responsiveness on 11/26 and had a stroke code. CTA negative for new cva. EEG showed no seizure.  "  - Neurology consulted and input appreciated.     History of asthma: not in acute exacerbation. Albuterol prn    History of CVA: in  with severe preeclampsia during child birth. Had emergency  and she thinks it was a hemorrhagic cva. Continue PTA gabapentin.    Hypomagnesemia: Magnesium 1.6 on admission. Now returned to normal after replacement.     Social issue: lack of insurance. Social work consulted.     Chronic anemia: hemoglobin at the level of 10, at her baseline. Monitor.     Pseudohyponatremia: sodium 129 on admission with blood glucose 396. Now returned to normal.    Her sister and brother were by the bedside. Plan of care discussed.         Diet: Moderate Consistent Carb (60 g CHO per Meal) Diet    DVT Prophylaxis: Enoxaparin (Lovenox) SQ  Ardon Catheter: Not present  Lines: None     Cardiac Monitoring: ACTIVE order. Indication: sepsis  Code Status: Full Code      Clinically Significant Risk Factors              # Hypoalbuminemia: Lowest albumin = 2.9 g/dL at 2023  6:31 AM, will monitor as appropriate           # DMII: A1C = 13.5 % (Ref range: <5.7 %) within past 6 months, PRESENT ON ADMISSION  # Obesity: Estimated body mass index is 31.89 kg/m  as calculated from the following:    Height as of this encounter: 1.6 m (5' 3\").    Weight as of this encounter: 81.6 kg (180 lb)., PRESENT ON ADMISSION            Disposition Plan     Expected Discharge Date: 2023    Discharge Delays: IV Medication - consider oral or Home Infusion  Financial counseling needed    Discharge Comments: COVID+  +BC   EEG  IV Abx            Alpa Colmenares MD  Hospitalist Service  [facility]  Securely message with [employer] (more info)  Text page via [facility] Paging/Directory   ______________________________________________________________________    Interval History   Patient is new to me. Previous progress notes reviewed.  Patient spiked low grade fever with temperature up to 100.1 today. " Patient reports feeling lack of energy overall. No cough, SOB or back pain. No leg swelling.   We discussed about diabetes regimen after discharge. Per sister, patient likes to snack on candies. We discussed about choices of healthy snacks.     Physical Exam   Vital Signs: Temp: (!) 100.6  F (38.1  C) Temp src: Oral BP: 113/66 Pulse: 102   Resp: 22 SpO2: 98 % O2 Device: None (Room air)    Weight: 180 lbs 0 oz    General appearance: not in acute distress  HEENT: PERRL, EOMI  Lungs: Clear breath sounds in bilateral lung fields  Cardiovascular: Regular rate and rhythm, normal S1-S2  Abdomen: Soft, non tender, no distension  Musculoskeletal: No joint swelling  Skin: No rash and no edema  Neurology: AAO ×3.  Cranial nerves II - XII normal.  Normal muscle strength in all four extremities.     Medical Decision Making       48 MINUTES SPENT BY ME on the date of service doing chart review, history, exam, documentation & further activities per the note.      Data     I have personally reviewed the following data over the past 24 hrs:    INR:  N/A PTT:  N/A   D-dimer:  1.59 (H) Fibrinogen:  N/A       Imaging results reviewed over the past 24 hrs:   No results found for this or any previous visit (from the past 24 hour(s)).

## 2023-11-29 NOTE — PROGRESS NOTES
Brief Neurology Note:    Electroencephalogram 11/28 is normal.   Patient appears to be stable.   No additional neurologic work-up needed unless status changes.   Please see Dr. Paredes's note for additional details.       Neurology will sign off. Please call if new questions arise.       Janeen Haynes MD

## 2023-11-29 NOTE — CONSULTS
CLINICAL NUTRITION NOTE    Consult - New DM diet ed  Have attempted to reach pt multiple times by room phone and cell phone yesterday and today. Unable to leave VM on personal cell phone as it is a generic outgoing message.     Asked nsg to ensure pt had room phone by her and RD would be calling today. Still no answer this afternoon.     Pt has been fatigued and with continued low grade temps, nauasa and inadequate intake.     Pt should already have Diabetic diet handouts that were sent 11/27. Sister is also DM, knows diet well and should be a good support per MD    Will continue to reach pt in her room.   *Recommend referral for Diabetic diet ed OP   *Recommend mvi with minerals d/t not meeting RDIs with inadequate intake  -Will trial gel plus supplement with poor intake  -Ordered weight

## 2023-11-29 NOTE — PROGRESS NOTES
Infectious Diseases Progress Note  [facility]    Date of visit: 11/29/2023     ASSESSMENT   38-year-old woman with a history of asthma and previous CVA who is admitted with fevers and UTI symptoms.    Left-sided pyelonephritis.  Presenting with fevers and dysuria.  CT scan showing left-sided pyelonephritis.  Urine culture positive for E. coli  E. coli bacteremia.  2 of 2 blood cultures on admission growing E. coli.  Likely secondary to urinary tract infection.  Elevated procalcitonin.  Diabetes, new diagnosis.  Hemoglobin A1c 13.5  COVID infection.  Positive COVID screen on admission.  Some shortness of breath on admission.  Initially needed oxygen, but is currently on room air.  CT scan of the chest without infiltrates.  Started on remdesivir.  History of CVA.  Chronic appearing left frontal lobe lesion.  Stroke code called this week. No signs of active stroke per neurology evaluation.  Symptoms resolved.    Principal Problem:    Acute pyelonephritis  Active Problems:    COVID-19 virus infection    Sepsis due to Escherichia coli without acute organ dysfunction (H)    Bacteremia due to Escherichia coli    Type 2 diabetes mellitus with hyperglycemia, without long-term current use of insulin (H)    Mild intermittent asthma without complication    History of CVA (cerebrovascular accident)    Hypomagnesemia    Chronic anemia       PLAN   -Continue ceftriaxone  -Anticipate 2-week course of antibiotics, likely switch to p.o. when ready for discharge - not yet as still with fevers      Salvatore Reynolds MD  [facility]  Direct messaging: [employer] Paging  On-Call ID provider: [phone], option: 9      ===========================================        SUBJECTIVE / INTERVAL HISTORY:     No events. Feels okay. Chills last night with fevers. Good appetite today.      Antibiotics   Ceftriaxone 11/25, 11/27-  Previous:    Zosyn 11/26-11/27  Remdesivir 11/26-11/28    Physical Exam     Temp:   "[98.9  F (37.2  C)-100.6  F (38.1  C)] 99.3  F (37.4  C)  Pulse:  [] 101  Resp:  [16-24] 20  BP: (107-125)/(58-80) 107/58  SpO2:  [98 %-100 %] 99 %    /58 (BP Location: Left arm)   Pulse 101   Temp 99.3  F (37.4  C) (Oral)   Resp 20   Ht 1.6 m (5' 3\")   Wt 81.6 kg (180 lb)   SpO2 99%   BMI 31.89 kg/m      GENERAL:  well-developed, well-nourished, sitting in chair in no acute distress.   HENT:  Head is normocephalic, atraumatic.   EYES:  Eyes have anicteric sclerae without conjunctival injection   LUNGS:  normal resp pattern  CARDIOVASCULAR:  tachy   ABDOMEN:  Normal bowel sounds, soft, nontender. No cva tenderness  EXT: Extremities warm and without edema.  SKIN:  No acute rashes.   NEUROLOGIC:  Grossly nonfocal.      Cultures   11/25 urine culture: Greater than 100,000 colonies E. coli  11/25 blood cultures x 2: E. coli  11/26 blood cultures x 2: No growth to date    Susceptibility data from last 90 days.  Collected Specimen Info Organism Ampicillin Ampicillin/Sulbactam Cefazolin Cefepime Cefoxitin Ceftazidime Ceftriaxone Ciprofloxacin Gentamicin Levofloxacin Meropenem Nitrofurantoin Piperacillin/Tazobactam Tobramycin   11/25/23 Peripheral Blood Escherichia coli                 11/25/23 Peripheral Blood Escherichia coli  S  S   S   S  S  S  S  S  S   S  S   11/25/23 Urine, Midstream Escherichia coli  S  S  S  S  S  S  S  S  S  S   S  S  S     Collected Specimen Info Organism Trimethoprim/Sulfamethoxazole    11/25/23 Peripheral Blood Escherichia coli    11/25/23 Peripheral Blood Escherichia coli  S   11/25/23 Urine, Midstream Escherichia coli  S             Pertinent Labs:     Recent Labs   Lab 11/28/23  0631 11/27/23  0700 11/26/23  0704   WBC 6.7 10.1 10.4   HGB 9.0* 9.7* 8.8*    251 226       Recent Labs   Lab 11/28/23  0631 11/27/23  0700 11/26/23  0704    132* 132*  132*   CO2 23 23 20*  20*   BUN 8.6 13.0 12.9  12.9   ALBUMIN 2.9* 3.3* 3.6   ALKPHOS 82 108 108   ALT 9 11 9 "   AST 15 15 16       Recent Labs   Lab 11/28/23  0631 11/27/23  0700 11/26/23  0704   CRPI 100.20* 213.60* 277.90*           Imaging:     CTA Head Neck with Contrast    Result Date: 11/27/2023  HEAD AND NECK CT ANGIOGRAM WITH IV CONTRAST 11/27/2023 3:26 PM CST INDICATION: Stroke symptoms, confusion and aphasia, slurred speech, lethargy. TECHNIQUE: Head and neck CT angiogram with IV contrast. Noncontrast head CT followed by axial helical CT images of the head and neck vessels obtained during the arterial phase of intravenous contrast administration. Axial helical 2D reconstructed images and multiplanar 3D MIP reconstructed images of the head and neck vessels were performed by the technologist. Dose reduction techniques were used. CONTRAST: 75ml isovue 370. COMPARISON: None. FINDINGS: NONCONTRAST HEAD CT: No intracranial hemorrhage, extraaxial collection, mass effect or CT evidence of acute infarct.  Moderate area of chronic encephalomalacia in the parasagittal left parietal lobe. Coarse nodular and linear calcification in the left frontal lobe. The ventricles and sulci are normal for age. Osseous structures are intact. The visualized paranasal sinuses are free of significant disease. The mastoid air cells are free of significant disease. The orbits are unremarkable. HEAD CTA: The intracranial circulation is patent without evidence for aneurysm, proximal vessel occlusion, high-grade intracranial stenosis or arteriovenous malformation. Patent dural venous sinuses. NECK CTA: Three vessel arch.  Carotid arteries are patent without atherosclerotic change.  No hemodynamically significant stenosis by NASCET criteria in either carotid system.  Patent vertebral arteries. No dissection.     CONCLUSION: HEAD CT: 1.  No intracranial hemorrhage, mass, or definite CT evidence of recent ischemia. 2.  Moderate area of chronic encephalomalacia in the parasagittal left parietal lobe. 3.  Left frontal lobe calcification appears  chronic. It could reflect old injury or an underlying abnormality such as a cavernoma. Comparison with old imaging would be most useful. HEAD CTA: 1.  Negative. No vessel stenosis, occlusion or aneurysm. NECK CTA: 1.  Negative. No dissection or hemodynamically significant narrowing in the neck by NASCET criteria. Findings were discussed with PAOLO TORRE via telephone at 1534 hours on 11/27/2023.    CT Abdomen Pelvis w Contrast    Result Date: 11/26/2023  EXAM: CT ABDOMEN PELVIS W CONTRAST LOCATION: [facility] DATE: 11/26/2023 INDICATION: 38 y o with urosepsis, check pyelo, also has COVID COMPARISON: None. TECHNIQUE: CT scan of the abdomen and pelvis was performed following injection of IV contrast. Multiplanar reformats were obtained. Dose reduction techniques were used. CONTRAST: isovue 370 90ml FINDINGS: LOWER CHEST: Normal. HEPATOBILIARY: Normal. PANCREAS: Normal. SPLEEN: Normal. ADRENAL GLANDS: Normal. KIDNEYS/BLADDER: Patchy enhancement of the left kidney with mild adjacent inflammatory stranding and fluid, correlate for pyelonephritis. No organized fluid collection. No hydroureteronephrosis. Ladder wall thickening consistent with cystitis. BOWEL: Large volume retained feces. No obstruction, colitis, or diverticulitis. Normal appendix. LYMPH NODES: Normal. VASCULATURE: Unremarkable. PELVIC ORGANS: Fibroid. MUSCULOSKELETAL: Normal.     IMPRESSION: 1.  Left pyelonephritis. No obstructing renal or ureteral stones. 2.  No obstruction, colitis, diverticulitis, or appendicitis. 3.  Calcified uterine fibroid.    CT Chest Pulmonary Embolism w Contrast    Result Date: 11/26/2023  EXAM: CT CHEST PULMONARY EMBOLISM W CONTRAST LOCATION: [facility] DATE: 11/26/2023 INDICATION: 38 y o wtih hx of asthma, COVID now, tachy, make sure no PE COMPARISON: None. TECHNIQUE: CT chest pulmonary angiogram during arterial phase injection of IV contrast. Multiplanar reformats and MIP  reconstructions were performed. Dose reduction techniques were used. CONTRAST: isovue 370 90ml FINDINGS: ANGIOGRAM CHEST: Pulmonary arteries are normal caliber and negative for pulmonary emboli. Thoracic aorta is negative for dissection. No CT evidence of right heart strain. LUNGS AND PLEURA: Discoid atelectasis. No pneumothorax or pleural effusion. MEDIASTINUM/AXILLAE: Right axilla within normal limits. CORONARY ARTERY CALCIFICATION: None. UPPER ABDOMEN: Please, see dedicated CT abdomen and pelvis performed same day. MUSCULOSKELETAL: Normal.     IMPRESSION: 1.  No pulmonary embolus, aortic dissection, or aneurysm. 2.  By lateral dependent atelectasis.        Data reviewed today: I reviewed all medications, new labs and imaging results over the last 24 hours. I personally reviewed no images or EKG's today.  The patient's care was discussed with the Patient.

## 2023-11-29 NOTE — PLAN OF CARE
Problem: Pain Acute  Goal: Optimal Pain Control and Function  Intervention: Prevent or Manage Pain  Recent Flowsheet Documentation  Taken 11/29/2023 1345 by Prosper Teixeira, RN  Medication Review/Management: medications reviewed  Taken 11/29/2023 1036 by Prosper Teixeira RN  Medication Review/Management: medications reviewed     Problem: Nausea and Vomiting  Goal: Nausea and Vomiting Relief  Outcome: Progressing  Intervention: Prevent and Manage Nausea and Vomiting  Recent Flowsheet Documentation  Taken 11/29/2023 1345 by Prosper Teixeira, RN  Nausea/Vomiting Interventions:   antiemetic   nausea triggers minimized   stimuli minimized  Taken 11/29/2023 1036 by Prosper Teixeira, RN  Nausea/Vomiting Interventions:   antiemetic   nausea triggers minimized   stimuli minimized   Goal Outcome Evaluation:       PRN IV compazine utilized to manage Pt's nausea. Pt's pain managed with PRN PO acetaminophen. Pt remains alert and oriented x4. Pt declined lab draws this AM. Provider discontinued lab tests for today. Heart rhythm normal sinus. Pt tolerated continuous normal saline infusion well. Infusion discontinued late in the AM shift. Family updated on Pt's condition via Pt's cell phone. No other concerns noted.   Prosper Teixeira RN  11/29/2023  3:05 PM

## 2023-11-29 NOTE — PLAN OF CARE
Problem: Pain Acute  Goal: Optimal Pain Control and Function  Outcome: Progressing  Intervention: Develop Pain Management Plan  Recent Flowsheet Documentation  Taken 11/29/2023 0126 by Ritika Bell RN  Pain Management Interventions: medication (see MAR)  Taken 11/28/2023 2255 by Ritika Bell RN  Pain Management Interventions: MD notified (comment)  Intervention: Prevent or Manage Pain  Recent Flowsheet Documentation  Taken 11/28/2023 2155 by Ritika Bell RN  Medication Review/Management: medications reviewed     Problem: Infection  Goal: Absence of Infection Signs and Symptoms  Outcome: Progressing  Intervention: Prevent or Manage Infection  Recent Flowsheet Documentation  Taken 11/28/2023 2155 by Ritika Bell RN  Isolation Precautions: special precautions maintained     Problem: Comorbidity Management  Goal: Blood Glucose Levels Within Targeted Range  Outcome: Progressing  Intervention: Monitor and Manage Glycemia  Recent Flowsheet Documentation  Taken 11/28/2023 2155 by Ritika Bell RN  Medication Review/Management: medications reviewed   Goal Outcome Evaluation: Patient fatigued but arousable. Slept most of shift. Gabapentin @ hs held d/t lethargy.  Neuros q4. Tylenol given for temps of 99.2, 99.3, 99.6, and 99.9. Has not helped much.  On cont pox and satting well on RA. Tele normal sinus. Up to bathroom with sba. NS @ 50mL/hr. Patient endorses pain to her R lateral side that tylenol has not helped. Cross coverage paged and PRN oxy 5mg ordered and effective. Patient later observed talking and laughing on her phone. BG at . Spot checked @ 0200 at was 159.

## 2023-11-30 VITALS
BODY MASS INDEX: 31.89 KG/M2 | HEART RATE: 88 BPM | DIASTOLIC BLOOD PRESSURE: 80 MMHG | WEIGHT: 180 LBS | HEIGHT: 63 IN | RESPIRATION RATE: 18 BRPM | OXYGEN SATURATION: 98 % | TEMPERATURE: 99 F | SYSTOLIC BLOOD PRESSURE: 127 MMHG

## 2023-11-30 LAB
GLUCOSE BLDC GLUCOMTR-MCNC: 139 MG/DL (ref 70–99)
GLUCOSE BLDC GLUCOMTR-MCNC: 239 MG/DL (ref 70–99)
GLUCOSE BLDC GLUCOMTR-MCNC: 265 MG/DL (ref 70–99)

## 2023-11-30 PROCEDURE — 250N000013 HC RX MED GY IP 250 OP 250 PS 637: Performed by: INTERNAL MEDICINE

## 2023-11-30 PROCEDURE — 99232 SBSQ HOSP IP/OBS MODERATE 35: CPT | Performed by: INTERNAL MEDICINE

## 2023-11-30 PROCEDURE — 99239 HOSP IP/OBS DSCHRG MGMT >30: CPT | Performed by: INTERNAL MEDICINE

## 2023-11-30 PROCEDURE — 250N000013 HC RX MED GY IP 250 OP 250 PS 637

## 2023-11-30 RX ORDER — INSULIN GLARGINE 100 [IU]/ML
10 INJECTION, SOLUTION SUBCUTANEOUS DAILY
Qty: 15 ML | Refills: 0 | Status: SHIPPED | OUTPATIENT
Start: 2023-11-30 | End: 2023-11-30

## 2023-11-30 RX ORDER — INSULIN LISPRO 100 [IU]/ML
INJECTION, SOLUTION INTRAVENOUS; SUBCUTANEOUS
Qty: 15 ML | Refills: 0 | Status: SHIPPED | OUTPATIENT
Start: 2023-11-30

## 2023-11-30 RX ORDER — MULTIPLE VITAMINS W/ MINERALS TAB 9MG-400MCG
1 TAB ORAL DAILY
Qty: 90 TABLET | Refills: 0 | Status: SHIPPED | OUTPATIENT
Start: 2023-12-01

## 2023-11-30 RX ORDER — INSULIN GLARGINE 100 [IU]/ML
10 INJECTION, SOLUTION SUBCUTANEOUS AT BEDTIME
Qty: 15 ML | Refills: 0 | Status: SHIPPED | OUTPATIENT
Start: 2023-11-30

## 2023-11-30 RX ORDER — CEFDINIR 300 MG/1
300 CAPSULE ORAL 2 TIMES DAILY
Qty: 20 CAPSULE | Refills: 0 | Status: SHIPPED | OUTPATIENT
Start: 2023-11-30 | End: 2023-12-10

## 2023-11-30 RX ADMIN — METFORMIN HYDROCHLORIDE 500 MG: 500 TABLET, FILM COATED ORAL at 08:00

## 2023-11-30 RX ADMIN — GABAPENTIN 300 MG: 300 CAPSULE ORAL at 14:50

## 2023-11-30 RX ADMIN — GABAPENTIN 300 MG: 300 CAPSULE ORAL at 07:36

## 2023-11-30 RX ADMIN — MULTIPLE VITAMINS W/ MINERALS TAB 1 TABLET: TAB at 08:00

## 2023-11-30 RX ADMIN — POLYETHYLENE GLYCOL 3350 17 G: 17 POWDER, FOR SOLUTION ORAL at 08:00

## 2023-11-30 ASSESSMENT — ACTIVITIES OF DAILY LIVING (ADL)
ADLS_ACUITY_SCORE: 18

## 2023-11-30 NOTE — PROGRESS NOTES
Infectious Diseases Progress Note  [facility]    Date of visit: 11/30/2023     ASSESSMENT   38-year-old woman with a history of asthma and previous CVA who is admitted with fevers and UTI symptoms.    Left-sided pyelonephritis.  Presenting with fevers and dysuria.  CT scan showing left-sided pyelonephritis.  Urine culture positive for E. coli  E. coli bacteremia.  2 of 2 blood cultures on admission growing E. coli.  Likely secondary to urinary tract infection.  Elevated procalcitonin.  Diabetes, new diagnosis.  Hemoglobin A1c 13.5  COVID infection.  Positive COVID screen on admission.  Some shortness of breath on admission.  Initially needed oxygen, but is currently on room air.  CT scan of the chest without infiltrates.  Started on remdesivir.  History of CVA.  Chronic appearing left frontal lobe lesion.  Stroke code called this week. No signs of active stroke per neurology evaluation.  Symptoms resolved.    Principal Problem:    Acute pyelonephritis  Active Problems:    COVID-19 virus infection    Sepsis due to Escherichia coli without acute organ dysfunction (H)    Bacteremia due to Escherichia coli    Type 2 diabetes mellitus with hyperglycemia, without long-term current use of insulin (H)    Mild intermittent asthma without complication    History of CVA (cerebrovascular accident)    Hypomagnesemia    Chronic anemia       PLAN   -Okay to discharge with cefdinir 300mg po bid until 12/9/23 from ID perspective.       Salvatore Reynolds MD  [facility]  Direct messaging: [employer] Paging  On-Call ID provider: [phone], option: 9      ===========================================        SUBJECTIVE / INTERVAL HISTORY:     Feeling much better. No fevers or chills. No back pain.      Antibiotics   Ceftriaxone 11/25, 11/27-  Previous:    Zosyn 11/26-11/27  Remdesivir 11/26-11/28    Physical Exam     Temp:  [98.5  F (36.9  C)-99.5  F (37.5  C)] 99  F (37.2  C)  Pulse:  [] 88  Resp:   "[16-20] 18  BP: (107-127)/(70-80) 127/80  SpO2:  [9 %-100 %] (P) 9 %    /80 (BP Location: Left arm)   Pulse 88   Temp 99  F (37.2  C) (Oral)   Resp 18   Ht 1.6 m (5' 3\")   Wt 81.6 kg (180 lb)   SpO2 (!) (P) 9%   BMI 31.89 kg/m      GENERAL:  well-developed, well-nourished, sitting in chair in no acute distress.   HENT:  Head is normocephalic, atraumatic.   EYES:  Eyes have anicteric sclerae without conjunctival injection   LUNGS:  normal resp pattern  EXT: Extremities warm and without edema.  SKIN:  No acute rashes.   NEUROLOGIC:  Grossly nonfocal.      Cultures   11/25 urine culture: Greater than 100,000 colonies E. coli  11/25 blood cultures x 2: E. coli  11/26 blood cultures x 2: No growth to date    Susceptibility data from last 90 days.  Collected Specimen Info Organism Ampicillin Ampicillin/Sulbactam Cefazolin Cefepime Cefoxitin Ceftazidime Ceftriaxone Ciprofloxacin Gentamicin Levofloxacin Meropenem Nitrofurantoin Piperacillin/Tazobactam Tobramycin   11/25/23 Peripheral Blood Escherichia coli                 11/25/23 Peripheral Blood Escherichia coli  S  S   S   S  S  S  S  S  S   S  S   11/25/23 Urine, Midstream Escherichia coli  S  S  S  S  S  S  S  S  S  S   S  S  S     Collected Specimen Info Organism Trimethoprim/Sulfamethoxazole    11/25/23 Peripheral Blood Escherichia coli    11/25/23 Peripheral Blood Escherichia coli  S   11/25/23 Urine, Midstream Escherichia coli  S             Pertinent Labs:     Recent Labs   Lab 11/28/23 0631 11/27/23  0700 11/26/23  0704   WBC 6.7 10.1 10.4   HGB 9.0* 9.7* 8.8*    251 226       Recent Labs   Lab 11/28/23 0631 11/27/23  0700 11/26/23  0704    132* 132*  132*   CO2 23 23 20*  20*   BUN 8.6 13.0 12.9  12.9   ALBUMIN 2.9* 3.3* 3.6   ALKPHOS 82 108 108   ALT 9 11 9   AST 15 15 16       Recent Labs   Lab 11/28/23 0631 11/27/23  0700 11/26/23  0704   CRPI 100.20* 213.60* 277.90*           Imaging:     CTA Head Neck with Contrast    Result " Date: 11/27/2023  HEAD AND NECK CT ANGIOGRAM WITH IV CONTRAST 11/27/2023 3:26 PM CST INDICATION: Stroke symptoms, confusion and aphasia, slurred speech, lethargy. TECHNIQUE: Head and neck CT angiogram with IV contrast. Noncontrast head CT followed by axial helical CT images of the head and neck vessels obtained during the arterial phase of intravenous contrast administration. Axial helical 2D reconstructed images and multiplanar 3D MIP reconstructed images of the head and neck vessels were performed by the technologist. Dose reduction techniques were used. CONTRAST: 75ml isovue 370. COMPARISON: None. FINDINGS: NONCONTRAST HEAD CT: No intracranial hemorrhage, extraaxial collection, mass effect or CT evidence of acute infarct.  Moderate area of chronic encephalomalacia in the parasagittal left parietal lobe. Coarse nodular and linear calcification in the left frontal lobe. The ventricles and sulci are normal for age. Osseous structures are intact. The visualized paranasal sinuses are free of significant disease. The mastoid air cells are free of significant disease. The orbits are unremarkable. HEAD CTA: The intracranial circulation is patent without evidence for aneurysm, proximal vessel occlusion, high-grade intracranial stenosis or arteriovenous malformation. Patent dural venous sinuses. NECK CTA: Three vessel arch.  Carotid arteries are patent without atherosclerotic change.  No hemodynamically significant stenosis by NASCET criteria in either carotid system.  Patent vertebral arteries. No dissection.     CONCLUSION: HEAD CT: 1.  No intracranial hemorrhage, mass, or definite CT evidence of recent ischemia. 2.  Moderate area of chronic encephalomalacia in the parasagittal left parietal lobe. 3.  Left frontal lobe calcification appears chronic. It could reflect old injury or an underlying abnormality such as a cavernoma. Comparison with old imaging would be most useful. HEAD CTA: 1.  Negative. No vessel stenosis,  occlusion or aneurysm. NECK CTA: 1.  Negative. No dissection or hemodynamically significant narrowing in the neck by NASCET criteria. Findings were discussed with PAOLO TORRE via telephone at 1534 hours on 11/27/2023.    CT Abdomen Pelvis w Contrast    Result Date: 11/26/2023  EXAM: CT ABDOMEN PELVIS W CONTRAST LOCATION: [facility] DATE: 11/26/2023 INDICATION: 38 y o with urosepsis, check pyelo, also has COVID COMPARISON: None. TECHNIQUE: CT scan of the abdomen and pelvis was performed following injection of IV contrast. Multiplanar reformats were obtained. Dose reduction techniques were used. CONTRAST: isovue 370 90ml FINDINGS: LOWER CHEST: Normal. HEPATOBILIARY: Normal. PANCREAS: Normal. SPLEEN: Normal. ADRENAL GLANDS: Normal. KIDNEYS/BLADDER: Patchy enhancement of the left kidney with mild adjacent inflammatory stranding and fluid, correlate for pyelonephritis. No organized fluid collection. No hydroureteronephrosis. Ladder wall thickening consistent with cystitis. BOWEL: Large volume retained feces. No obstruction, colitis, or diverticulitis. Normal appendix. LYMPH NODES: Normal. VASCULATURE: Unremarkable. PELVIC ORGANS: Fibroid. MUSCULOSKELETAL: Normal.     IMPRESSION: 1.  Left pyelonephritis. No obstructing renal or ureteral stones. 2.  No obstruction, colitis, diverticulitis, or appendicitis. 3.  Calcified uterine fibroid.    CT Chest Pulmonary Embolism w Contrast    Result Date: 11/26/2023  EXAM: CT CHEST PULMONARY EMBOLISM W CONTRAST LOCATION: [facility] DATE: 11/26/2023 INDICATION: 38 y o wtih hx of asthma, COVID now, tachy, make sure no PE COMPARISON: None. TECHNIQUE: CT chest pulmonary angiogram during arterial phase injection of IV contrast. Multiplanar reformats and MIP reconstructions were performed. Dose reduction techniques were used. CONTRAST: isovue 370 90ml FINDINGS: ANGIOGRAM CHEST: Pulmonary arteries are normal caliber and negative for  pulmonary emboli. Thoracic aorta is negative for dissection. No CT evidence of right heart strain. LUNGS AND PLEURA: Discoid atelectasis. No pneumothorax or pleural effusion. MEDIASTINUM/AXILLAE: Right axilla within normal limits. CORONARY ARTERY CALCIFICATION: None. UPPER ABDOMEN: Please, see dedicated CT abdomen and pelvis performed same day. MUSCULOSKELETAL: Normal.     IMPRESSION: 1.  No pulmonary embolus, aortic dissection, or aneurysm. 2.  By lateral dependent atelectasis.        Data reviewed today: I reviewed all medications, new labs and imaging results over the last 24 hours. I personally reviewed no images or EKG's today.  The patient's care was discussed with the Bedside Nurse and Patient.

## 2023-11-30 NOTE — CONSULTS
NUTRITION EDUCATION      REASON FOR ASSESSMENT:  Consult - New Diabetic    NUTRITION HISTORY:  Information obtained from pt/chart  Pt presents with CVA, covid and newly dx DM2    Pt A1C 13.5 on admit.   Bg in fair control, 130-239 mg/dl past 24 hours, improving.     Pt intake has been poor d/t covid sx. Improved today, eating 100%    Spoke with pt over the phone  Pt reports she does have the handout provided, has reviewed it and looked at it while we were talking    Pt reports she has some prior DM diet knowledge d/t  her sister being Diabetic  Pt reports she normally eats regularly throughout the day    CURRENT DIET:  Moderate consistent CHO diet   Gel plus bid added yesterday d/t poor intake    NUTRITION DIAGNOSIS:  Food- and nutrition-related knowledge deficit R/t newly Dx DM evidenced by elevated A1C, no formal DM diet ed prior    INTERVENTIONS:    Nutrition Prescription:  3-4 CHO servings per meal with protein/fat  Up to 2 servings CHO in between meal snacks with protein/fat    Implementation:      *  Nutrition Education (Content):   A)  Provided handout Carbohydrate counting for people with DM, using a nutrition label; carbohydrates   B)  Discussed CHO and non CHO foods, recommended servings, how to treat hypoglycemia, how to review CHO on nutrition label;, measuring food/liquid portions on dishes      *  Nutrition Education (Application):   A)  Discussed current eating habits and recommended alternative food choices      *  Anticipate good compliance- pt is hopefull diet will become part of her routine after some practice      *  Diet Education - refer to Education Flowsheet    Goals:      *  Patient will verbalize understanding of diet       *  All of the above goals met during the education session    Follow Up/Monitoring:      *  Provided RD contact information for future questions      *  Recommended Out-Patient Nutrition Referral, if further diet instructions are needed

## 2023-11-30 NOTE — PLAN OF CARE
Goal Outcome Evaluation:    VSS overnight.  No complaints of pain.  PRN melatonin requested and administered for sleep.  Pt. Is alert and oriented and able to make needs known.

## 2023-11-30 NOTE — PROGRESS NOTES
Care Management Follow Up    Length of Stay (days): 4    Expected Discharge Date: 12/01/2023     Concerns to be Addressed:  BG management       Patient plan of care discussed at interdisciplinary rounds: Yes    Anticipated Discharge Disposition:  brother's home     Anticipated Discharge Services:  none    Anticipated Discharge DME:  none     Education Provided on the Discharge Plan:  per care team    Patient/Family in Agreement with the Plan:  yes      Additional Information:  Patient with history of asthma and CVA. Admitted on 11/25/23 with UTI and sepsis. COVID positive. Neurology and ID consulted.  Will discharge on oral antibiotics.   See Diabetic Educator regarding insulin regime 11/27.        Social History:  Patient reports she recently moved here from [place]. She is living with her brother Raymond in his town house. She is independent with ADLs and IADLs, ambulates without devices.  She is not employed and has no insurance. Patient states her sister Kaela is her primary family contact. Family willing to transport at discharge.     Referral to Financial SW for lack of insurance.        11/30/23:  Patient to discharge to her brother's house.           Susan Pagan RN

## 2023-11-30 NOTE — PLAN OF CARE
Goal Outcome Evaluation:      Plan of Care Reviewed With: patient    Overall Patient Progress: improvingOverall Patient Progress: improving           Problem: Pain Acute  Goal: Optimal Pain Control and Function  Outcome: Progressing  Intervention: Prevent or Manage Pain  Recent Flowsheet Documentation  Taken 11/30/2023 0900 by Adrienne Menjivar, RN  Medication Review/Management: medications reviewed     Problem: Infection  Goal: Absence of Infection Signs and Symptoms  Intervention: Prevent or Manage Infection  Recent Flowsheet Documentation  Taken 11/30/2023 0900 by Adrienne Menjivar RN  Isolation Precautions: special precautions maintained     Problem: Nausea and Vomiting  Goal: Nausea and Vomiting Relief  Outcome: Progressing    Patient states she is feeling much better today.  Improved appetite, able to to tolerate breakfast and lunch.   this am 239 prior to lunch.  Independent in room.  Patient was able to give a pretend shot of insulin to a blanket for education purposes.  ID states can change over to PO ABX, and hospitalist stated working on discharge orders.  Evening staff to discharge.  Patient plans on discharging to home with family support from brother and sister.

## 2023-11-30 NOTE — PLAN OF CARE
Problem: Adult Inpatient Plan of Care  Goal: Optimal Comfort and Wellbeing  Outcome: Met     Problem: Infection  Goal: Absence of Infection Signs and Symptoms  Outcome: Met   Goal Outcome Evaluation:    Pt is alert and oriented x4,on room air. Pt denies any pain or discomfort. Pts BG was 265, held insulin as pt is discharging and will not eat dinner here. Encouraged pt to recheck BG before her meal later tonight.  Reviewed discharge paperwork with pt, answered questions regarding insulin administration. Pt will discharge to home, brother provided ride.   Apryl Fagan RN.

## 2023-11-30 NOTE — DISCHARGE SUMMARY
"[facility]  Hospitalist Discharge Summary      Date of Admission:  11/25/2023  Date of Discharge:  11/30/2023  Discharging Provider: Alpa Colmenares MD  Discharge Service: Hospitalist Service    Discharge Diagnoses     Principal Problem:    Acute pyelonephritis  Active Problems:    COVID-19 virus infection    Sepsis due to Escherichia coli without acute organ dysfunction (H)    Bacteremia due to Escherichia coli    Type 2 diabetes mellitus with hyperglycemia, without long-term current use of insulin (H)    Mild intermittent asthma without complication    History of CVA (cerebrovascular accident)    Hypomagnesemia    Chronic anemia       Clinically Significant Risk Factors     # DMII: A1C = 13.5 % (Ref range: <5.7 %) within past 6 months  # Obesity: Estimated body mass index is 31.89 kg/m  as calculated from the following:    Height as of this encounter: 1.6 m (5' 3\").    Weight as of this encounter: 81.6 kg (180 lb).       Follow-ups Needed After Discharge   Follow-up Appointments     Follow-up and recommended labs and tests       Please establish new primary care ASAP. Follow up primary care provider   within 7 days to evaluate medication change and for hospital follow- up.    The following labs/tests are recommended: Check blood sugar and adjust   medication dose accordingly.            Discharge Disposition   Discharged to home  Condition at discharge: Stable    Hospital Course     Sandee Arroyo is a 38 year old female with history of asthma and CVA admitted on 11/25/23 with UTI and sepsis. She was also found to have COVID infection and new diagnosis of diabetes. The following issues were addressed during this admission.      Sepsis, UTI, bacteremia:  Patient presented with fever with temperature up to 103 and abdominal pain. Lab test revealed significantly elevated procalcitonin to 5.53. CT abdomen showed left pyelonephritis, no obstructing renal or ureteral stones. Urine culture revealed " pansensitive E coli. Her blood cultures were also positive for E coli. Patient was initiated on Zosyn. It was later switched to Ceftriaxone based on culture sensitivity. Blood culture was repeated and it had no growth. Her fever and abdominal pain resolved. Per ID recommendation, patient was discharged on 10 day Cefdinir.      COVID19 infection:   Patient was tested positive for COVID19 during admission screen. She had elevated d dimer to 1.35. CT chest showed no focal infiltrate and no pulmonary embolism. US showed no DVT. Patient was suspected to have hypoxia and received one day of dexamethasone. She quickly got weaned off supplemental oxygen. She received Remdesivir for 3 days due to high risks of progression.      Type II diabetes with hyperglycemia:   This is a new diagnosis. HbA1C is 13.5. Patient was initiated on Lantus and metformin. Her blood sugar is controlled. Patient was given diabetes education. Patient is recommended to follow up with PCP to further optimize her diabetes regimen.      Episodes of altered mental status:   Patient had one episode of less responsiveness on  and had a stroke code. CTA was negative for new cva. EEG showed no seizure. Her mental status quickly returned to her normal baseline.     Hypomagnesemia:   Magnesium was 1.6 on admission. It returned to normal after replacement.     Chronic stable conditions:    History of asthma: not in acute exacerbation.    History of CVA: in  with severe preeclampsia during child birth. Had emergency  and she thinks it was a hemorrhagic cva. Continue PTA gabapentin.  Chronic anemia: hemoglobin at the level of 10, at her baseline.       Consultations This Hospital Stay   DIABETES EDUCATION IP CONSULT  NUTRITION SERVICES ADULT IP CONSULT  PHARMACY IP CONSULT  INFECTIOUS DISEASES IP CONSULT  CARE MANAGEMENT / SOCIAL WORK IP CONSULT  NEUROLOGY IP CONSULT    Code Status   Full Code    Time Spent on this Encounter   IAlpa,  MD, personally saw the patient today and spent greater than 30 minutes discharging this patient.       Alpa Colmenares MD  [place] [id number] [facility] [address]  Phone: [phone]  Fax: [phone]  ______________________________________________________________________    Physical Exam   Vital Signs: Temp: 99  F (37.2  C) Temp src: Oral BP: 127/80 Pulse: 88   Resp: 18 SpO2: (!) (P) 9 % O2 Device: None (Room air)    Weight: 180 lbs 0 oz    General appearance: not in acute distress  HEENT: PERRL, EOMI  Lungs: Clear breath sounds in bilateral lung fields  Cardiovascular: Regular rate and rhythm, normal S1-S2  Abdomen: Soft, non tender, no distension  Musculoskeletal: No joint swelling  Skin: No rash and no edema  Neurology: AAO ×3.  Cranial nerves II - XII normal.  Normal muscle strength in all four extremities.        Primary Care Physician   Physician No Ref-Primary    Discharge Orders      Reason for your hospital stay    Admitted for acute pyelonephritis and bacteremia. Also found to have new diabetes.     Follow-up and recommended labs and tests     Please establish new primary care ASAP. Follow up primary care provider within 7 days to evaluate medication change and for hospital follow- up.  The following labs/tests are recommended: Check blood sugar and adjust medication dose accordingly.     Activity    Your activity upon discharge: activity as tolerated     Diet    Follow this diet upon discharge:       Snacks/Supplements Adult: Gelatein Plus; Between Meals      Moderate Consistent Carb (60 g CHO per Meal) Diet       Significant Results and Procedures   Most Recent 3 CBC's:  Recent Labs   Lab Test 11/28/23  0631 11/27/23  0700 11/26/23  0704   WBC 6.7 10.1 10.4   HGB 9.0* 9.7* 8.8*   MCV 75* 76* 75*    251 226     Most Recent 3 BMP's:  Recent Labs   Lab Test 11/30/23  1610 11/30/23  1225 11/30/23  0735 11/28/23  0816 11/28/23  0631 11/27/23  0813  11/27/23  0700 11/26/23  0747 11/26/23  0704   NA  --   --   --   --  135  --  132*  --  132*  132*   POTASSIUM  --   --   --   --  3.4  --  4.0  --  4.1  4.1   CHLORIDE  --   --   --   --  105  --  101  --  98  98   CO2  --   --   --   --  23  --  23  --  20*  20*   BUN  --   --   --   --  8.6  --  13.0  --  12.9  12.9   CR  --   --   --   --  0.61  --  0.60  --  0.68  0.68   ANIONGAP  --   --   --   --  7  --  8  --  14  14   ANGIE  --   --   --   --  7.6*  --  8.4*  --  8.2*  8.2*   * 239* 139*   < > 100*   < > 291*   < > 311*  311*    < > = values in this interval not displayed.     Most Recent 2 LFT's:  Recent Labs   Lab Test 11/28/23  0631 11/27/23  0700   AST 15 15   ALT 9 11   ALKPHOS 82 108   BILITOTAL 0.2 0.3       EXAM: CT CHEST PULMONARY EMBOLISM W CONTRAST  LOCATION: [facility]  DATE: 11/26/2023     INDICATION: 38 y o wtih hx of asthma, COVID now, tachy, make sure no PE  COMPARISON: None.  TECHNIQUE: CT chest pulmonary angiogram during arterial phase injection of IV contrast. Multiplanar reformats and MIP reconstructions were performed. Dose reduction techniques were used.   CONTRAST: isovue 370 90ml     FINDINGS:  ANGIOGRAM CHEST: Pulmonary arteries are normal caliber and negative for pulmonary emboli. Thoracic aorta is negative for dissection. No CT evidence of right heart strain.     LUNGS AND PLEURA: Discoid atelectasis. No pneumothorax or pleural effusion.     MEDIASTINUM/AXILLAE: Right axilla within normal limits.     CORONARY ARTERY CALCIFICATION: None.     UPPER ABDOMEN: Please, see dedicated CT abdomen and pelvis performed same day.     MUSCULOSKELETAL: Normal.                                                                      IMPRESSION:  1.  No pulmonary embolus, aortic dissection, or aneurysm.  2.  By lateral dependent atelectasis.      EXAM: CT ABDOMEN PELVIS W CONTRAST  LOCATION: [facility]  DATE: 11/26/2023     INDICATION:  38 y o with urosepsis, check pyelo, also has COVID  COMPARISON: None.  TECHNIQUE: CT scan of the abdomen and pelvis was performed following injection of IV contrast. Multiplanar reformats were obtained. Dose reduction techniques were used.  CONTRAST: isovue 370 90ml     FINDINGS:   LOWER CHEST: Normal.     HEPATOBILIARY: Normal.     PANCREAS: Normal.     SPLEEN: Normal.     ADRENAL GLANDS: Normal.     KIDNEYS/BLADDER: Patchy enhancement of the left kidney with mild adjacent inflammatory stranding and fluid, correlate for pyelonephritis. No organized fluid collection. No hydroureteronephrosis. Ladder wall thickening consistent with cystitis.     BOWEL: Large volume retained feces. No obstruction, colitis, or diverticulitis. Normal appendix.     LYMPH NODES: Normal.     VASCULATURE: Unremarkable.     PELVIC ORGANS: Fibroid.     MUSCULOSKELETAL: Normal.                                                                      IMPRESSION:   1.  Left pyelonephritis. No obstructing renal or ureteral stones.  2.  No obstruction, colitis, diverticulitis, or appendicitis.  3.  Calcified uterine fibroid.      HEAD AND NECK CT ANGIOGRAM WITH IV CONTRAST  11/27/2023 3:26 PM CST     INDICATION: Stroke symptoms, confusion and aphasia, slurred speech, lethargy.  TECHNIQUE: Head and neck CT angiogram with IV contrast. Noncontrast head CT followed by axial helical CT images of the head and neck vessels obtained during the arterial phase of intravenous contrast administration. Axial helical 2D reconstructed images   and multiplanar 3D MIP reconstructed images of the head and neck vessels were performed by the technologist. Dose reduction techniques were used.  CONTRAST: 75ml isovue 370.  COMPARISON: None.      FINDINGS:  NONCONTRAST HEAD CT: No intracranial hemorrhage, extraaxial collection, mass effect or CT evidence of acute infarct.  Moderate area of chronic encephalomalacia in the parasagittal left parietal lobe. Coarse nodular and  linear calcification in the left   frontal lobe. The ventricles and sulci are normal for age. Osseous structures are intact. The visualized paranasal sinuses are free of significant disease. The mastoid air cells are free of significant disease. The orbits are unremarkable.     HEAD CTA: The intracranial circulation is patent without evidence for aneurysm, proximal vessel occlusion, high-grade intracranial stenosis or arteriovenous malformation. Patent dural venous sinuses.     NECK CTA: Three vessel arch.  Carotid arteries are patent without atherosclerotic change.  No hemodynamically significant stenosis by NASCET criteria in either carotid system.  Patent vertebral arteries. No dissection.                                                                      CONCLUSION:  HEAD CT:  1.  No intracranial hemorrhage, mass, or definite CT evidence of recent ischemia.  2.  Moderate area of chronic encephalomalacia in the parasagittal left parietal lobe.  3.  Left frontal lobe calcification appears chronic. It could reflect old injury or an underlying abnormality such as a cavernoma. Comparison with old imaging would be most useful.     HEAD CTA:  1.  Negative. No vessel stenosis, occlusion or aneurysm.     NECK CTA:  1.  Negative. No dissection or hemodynamically significant narrowing in the neck by NASCET criteria.      EXAM: US LOWER EXTREMITY VENOUS DUPLEX BILATERAL  LOCATION: [facility]  DATE: 11/29/2023     INDICATION: Has fever, Covid infection and elevated d dimer. Evaluate for clot.  COMPARISON: None.  TECHNIQUE: Venous Duplex ultrasound of bilateral lower extremities with and without compression, augmentation and duplex. Color flow and spectral Doppler with waveform analysis performed.     FINDINGS: Exam includes the common femoral, femoral, popliteal veins as well as segmentally visualized deep calf veins and greater saphenous vein.      RIGHT: No deep vein thrombosis. No superficial  thrombophlebitis. No popliteal cyst.     LEFT: No deep vein thrombosis. No superficial thrombophlebitis. No popliteal cyst.                                                                      IMPRESSION:  1.  No deep venous thrombosis in either leg.          Discharge Medications   Current Discharge Medication List        START taking these medications    Details   cefdinir (OMNICEF) 300 MG capsule Take 1 capsule (300 mg) by mouth 2 times daily for 10 days  Qty: 20 capsule, Refills: 0    Associated Diagnoses: Type 2 diabetes mellitus with hyperglycemia, without long-term current use of insulin (H)      insulin glargine (BASAGLAR KWIKPEN) 100 UNIT/ML pen Inject 10 Units Subcutaneous at bedtime  Qty: 15 mL, Refills: 0    Comments: If Basaglar is not covered by insurance, may substitute Lantus or Semglee or other insulin glargine product per insurance preference at same dose and frequency.    Associated Diagnoses: Type 2 diabetes mellitus with hyperglycemia, without long-term current use of insulin (H)      insulin lispro (HUMALOG KWIKPEN) 100 UNIT/ML (1 unit dial) KWIKPEN Correction Scale - MEDIUM INSULIN RESISTANCE DOSING   Do Not give Correction Insulin if Pre-Meal BG less than 140. For Pre-Meal  - 189 give 0unit. For Pre-Meal  - 239 give 0 units. For Pre-Meal  - 289 give 1 units. For Pre-Meal  - 339 give 2 units. For Pre-Meal - 399 give 3 units. For Pre-Meal -449 give 4 units For Pre-Meal BG greater than or equal to 450 give 5 units. To be given with prandial insulin, and based on pre-meal blood glucose. Administering insulin within 5 minutes of the start of the meal is ideal. Administer insulin no more than 30 minutes after the start of the meal, unless directed otherwise by provider.  Qty: 15 mL, Refills: 0    Associated Diagnoses: Type 2 diabetes mellitus with hyperglycemia, without long-term current use of insulin (H)      insulin pen needle (32G X 4 MM) 32G X 4 MM  miscellaneous Use 1 pen needles daily or as directed.  Qty: 100 each, Refills: 0    Associated Diagnoses: Type 2 diabetes mellitus with hyperglycemia, without long-term current use of insulin (H)      metFORMIN (GLUCOPHAGE) 500 MG tablet Take 1 tablet (500 mg) by mouth 2 times daily (with meals)  Qty: 120 tablet, Refills: 0    Associated Diagnoses: Type 2 diabetes mellitus with hyperglycemia, without long-term current use of insulin (H)      multivitamin w/minerals (THERA-VIT-M) tablet Take 1 tablet by mouth daily  Qty: 90 tablet, Refills: 0    Associated Diagnoses: Type 2 diabetes mellitus with hyperglycemia, without long-term current use of insulin (H)           CONTINUE these medications which have NOT CHANGED    Details   gabapentin (NEURONTIN) 300 MG capsule Take 300 mg by mouth 3 times daily           Allergies   No Known Allergies

## 2023-11-30 NOTE — PLAN OF CARE
Problem: Adult Inpatient Plan of Care  Goal: Absence of Hospital-Acquired Illness or Injury  Intervention: Identify and Manage Fall Risk  Recent Flowsheet Documentation  Taken 11/29/2023 1745 by Adrienne Menjivar, RN  Safety Promotion/Fall Prevention:   nonskid shoes/slippers when out of bed   safety round/check completed   patient and family education   clutter free environment maintained     Problem: Adult Inpatient Plan of Care  Goal: Absence of Hospital-Acquired Illness or Injury  Intervention: Prevent Skin Injury  Recent Flowsheet Documentation  Taken 11/29/2023 1745 by Adrienne Menjivar RN  Body Position: position changed independently     Problem: Pain Acute  Goal: Optimal Pain Control and Function  Intervention: Prevent or Manage Pain  Recent Flowsheet Documentation  Taken 11/29/2023 1745 by Adrienne Menjivar RN  Medication Review/Management: medications reviewed     Problem: Infection  Goal: Absence of Infection Signs and Symptoms  Outcome: Progressing  Intervention: Prevent or Manage Infection  Recent Flowsheet Documentation  Taken 11/29/2023 1745 by Adrienne Menjivar, RN  Isolation Precautions: special precautions maintained   Goal Outcome Evaluation:           Overall Patient Progress: improvingOverall Patient Progress: improving    Patient denies pain.  Denied nausea.  SBA with IV ABX hooked up.  States appetite a little better than yesterday.  Fired Sepsis protocol, down for Doppler ultrasound of Bilateral lower extremities when fired so lactic acid and VS delayed until patient returned to unit.        Bed: 09  Expected date:   Expected time:   Means of arrival:   Comments:  LW - weakness

## 2023-12-01 LAB
BACTERIA BLD CULT: NO GROWTH
BACTERIA BLD CULT: NO GROWTH